# Patient Record
Sex: FEMALE | Race: BLACK OR AFRICAN AMERICAN | Employment: UNEMPLOYED | URBAN - METROPOLITAN AREA
[De-identification: names, ages, dates, MRNs, and addresses within clinical notes are randomized per-mention and may not be internally consistent; named-entity substitution may affect disease eponyms.]

---
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
## 2020-01-17 ENCOUNTER — HOSPITAL ENCOUNTER (EMERGENCY)
Age: 20
Discharge: HOME | End: 2020-01-17
Payer: COMMERCIAL

## 2020-01-17 VITALS
OXYGEN SATURATION: 94 % | DIASTOLIC BLOOD PRESSURE: 56 MMHG | TEMPERATURE: 97.9 F | HEART RATE: 88 BPM | SYSTOLIC BLOOD PRESSURE: 127 MMHG | RESPIRATION RATE: 18 BRPM

## 2020-01-17 PROCEDURE — 90471 IMMUNIZATION ADMIN: CPT | Performed by: NURSE PRACTITIONER

## 2020-01-17 PROCEDURE — 6360000002 HC RX W HCPCS: Performed by: NURSE PRACTITIONER

## 2020-01-17 PROCEDURE — 90715 TDAP VACCINE 7 YRS/> IM: CPT | Performed by: NURSE PRACTITIONER

## 2020-01-17 PROCEDURE — 99282 EMERGENCY DEPT VISIT SF MDM: CPT

## 2020-01-17 RX ADMIN — TETANUS TOXOID, REDUCED DIPHTHERIA TOXOID AND ACELLULAR PERTUSSIS VACCINE, ADSORBED 0.5 ML: 5; 2.5; 8; 8; 2.5 SUSPENSION INTRAMUSCULAR at 14:27

## 2020-01-17 ASSESSMENT — PAIN DESCRIPTION - ORIENTATION: ORIENTATION: RIGHT

## 2020-01-17 ASSESSMENT — PAIN SCALES - GENERAL: PAINLEVEL_OUTOF10: 7

## 2020-01-17 ASSESSMENT — PAIN DESCRIPTION - LOCATION: LOCATION: HAND

## 2020-01-17 ASSESSMENT — PAIN DESCRIPTION - PAIN TYPE: TYPE: ACUTE PAIN

## 2020-01-19 ENCOUNTER — HOSPITAL ENCOUNTER (EMERGENCY)
Age: 20
Discharge: HOME | End: 2020-01-19
Payer: COMMERCIAL

## 2020-01-19 VITALS
DIASTOLIC BLOOD PRESSURE: 71 MMHG | OXYGEN SATURATION: 100 % | WEIGHT: 135 LBS | HEART RATE: 100 BPM | TEMPERATURE: 97.9 F | HEIGHT: 59 IN | SYSTOLIC BLOOD PRESSURE: 124 MMHG | RESPIRATION RATE: 16 BRPM | BODY MASS INDEX: 27.21 KG/M2

## 2020-01-19 PROCEDURE — 99282 EMERGENCY DEPT VISIT SF MDM: CPT

## 2020-01-19 RX ORDER — PREDNISONE 20 MG/1
TABLET ORAL
Qty: 18 TABLET | Refills: 0 | Status: SHIPPED | OUTPATIENT
Start: 2020-01-19 | End: 2020-01-29

## 2020-01-19 RX ORDER — VALACYCLOVIR HYDROCHLORIDE 1 G/1
1000 TABLET, FILM COATED ORAL 3 TIMES DAILY
COMMUNITY
End: 2020-01-19 | Stop reason: SDUPTHER

## 2020-01-19 RX ORDER — VALACYCLOVIR HYDROCHLORIDE 1 G/1
1000 TABLET, FILM COATED ORAL 3 TIMES DAILY
Qty: 21 TABLET | Refills: 0 | Status: SHIPPED | OUTPATIENT
Start: 2020-01-19 | End: 2020-01-26

## 2020-01-19 RX ORDER — GABAPENTIN 100 MG/1
100 CAPSULE ORAL 3 TIMES DAILY
Qty: 21 CAPSULE | Refills: 0 | Status: SHIPPED | OUTPATIENT
Start: 2020-01-19 | End: 2020-01-26

## 2020-01-19 ASSESSMENT — PAIN DESCRIPTION - PAIN TYPE: TYPE: ACUTE PAIN

## 2020-01-19 ASSESSMENT — PAIN DESCRIPTION - DESCRIPTORS: DESCRIPTORS: ACHING

## 2020-01-19 ASSESSMENT — PAIN DESCRIPTION - LOCATION: LOCATION: EYE

## 2020-01-19 ASSESSMENT — PAIN DESCRIPTION - FREQUENCY: FREQUENCY: CONTINUOUS

## 2020-01-19 ASSESSMENT — PAIN SCALES - GENERAL: PAINLEVEL_OUTOF10: 7

## 2020-01-19 ASSESSMENT — PAIN DESCRIPTION - ORIENTATION: ORIENTATION: LEFT

## 2020-01-19 ASSESSMENT — PAIN DESCRIPTION - PROGRESSION: CLINICAL_PROGRESSION: GRADUALLY WORSENING

## 2020-01-19 NOTE — ED PROVIDER NOTES
Specific conditions for emergent return have been discussed and the patient verbalized understanding to return immediately for new or worsening symptoms. Counseling: The emergency provider has spoken with the patient and discussed todays results, in addition to providing specific details for the plan of care and counseling regarding the diagnosis and prognosis. Questions are answered at this time and they are agreeable with the plan. Assessment      1. Herpes simplex      Plan   Discharge to home  Patient condition is good    New Medications     New Prescriptions    GABAPENTIN (NEURONTIN) 100 MG CAPSULE    Take 1 capsule by mouth 3 times daily for 7 days. Intended supply: 90 days    PREDNISONE (DELTASONE) 20 MG TABLET    Sig.: Take 60mg  Po qd x 3 days, then 40mg po qd x3 days, then 20mg po qd x 3 days. QS x 9 days    VALACYCLOVIR (VALTREX) 1 G TABLET    Take 1 tablet by mouth 3 times daily for 7 days     Electronically signed by Dionicio Ward PA-C   DD: 1/19/20  **This report was transcribed using voice recognition software. Every effort was made to ensure accuracy; however, inadvertent computerized transcription errors may be present.   END OF ED PROVIDER NOTE        Dionicio Ward PA-C  01/19/20 0948

## 2020-01-20 NOTE — ED PROVIDER NOTES
of 1/17/2020  2:20 PM        Electronically signed by BETHANY Pompa CNP   DD: 1/20/20  **This report was transcribed using voice recognition software. Every effort was made to ensure accuracy; however, inadvertent computerized transcription errors may be present.   END OF ED PROVIDER NOTE      BETHANY Robertson CNP  01/20/20 7152

## 2020-10-06 ENCOUNTER — HOSPITAL ENCOUNTER (EMERGENCY)
Age: 20
Discharge: HOME | End: 2020-10-06
Payer: COMMERCIAL

## 2020-10-06 VITALS
DIASTOLIC BLOOD PRESSURE: 68 MMHG | TEMPERATURE: 98 F | HEART RATE: 82 BPM | OXYGEN SATURATION: 97 % | RESPIRATION RATE: 16 BRPM | SYSTOLIC BLOOD PRESSURE: 111 MMHG

## 2020-10-06 PROCEDURE — 99282 EMERGENCY DEPT VISIT SF MDM: CPT

## 2020-10-06 PROCEDURE — 99283 EMERGENCY DEPT VISIT LOW MDM: CPT

## 2020-10-06 PROCEDURE — 6370000000 HC RX 637 (ALT 250 FOR IP): Performed by: PHYSICIAN ASSISTANT

## 2020-10-06 RX ORDER — VALACYCLOVIR HYDROCHLORIDE 1 G/1
1000 TABLET, FILM COATED ORAL 3 TIMES DAILY
Qty: 21 TABLET | Refills: 1 | Status: SHIPPED | OUTPATIENT
Start: 2020-10-06 | End: 2020-10-13

## 2020-10-06 RX ORDER — VALACYCLOVIR HYDROCHLORIDE 500 MG/1
1000 TABLET, FILM COATED ORAL ONCE
Status: COMPLETED | OUTPATIENT
Start: 2020-10-06 | End: 2020-10-06

## 2020-10-06 RX ORDER — LIDOCAINE 50 MG/G
OINTMENT TOPICAL
Qty: 1 TUBE | Refills: 0 | Status: SHIPPED | OUTPATIENT
Start: 2020-10-06 | End: 2020-10-15

## 2020-10-06 RX ADMIN — VALACYCLOVIR HYDROCHLORIDE 1000 MG: 500 TABLET, FILM COATED ORAL at 14:25

## 2020-10-06 NOTE — ED PROVIDER NOTES
[facility]     Department of Emergency Medicine   ED  Provider Note  Admit Date/RoomTime: 10/6/2020  1:55 PM  ED Room: /[facility]  Chief Complaint   Rash (R ear, sts hx herpes)    History of Present Illness   Source of history provided by:  patient. History/Exam Limitations: none. Christopher Jones is a 23 y.o. old female with has a past medical history of:   Past Medical History:   Diagnosis Date    Asthma     Herpes     presents to the emergency department by private vehicle, for complaint of sudden onset red, raised, blistering and spreading area on  Right top of pinna which began 1 day(s) prior to arrival.  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 Pt states she has had this many times before and has been admitted to the hospital for it. Patient states she is had skin biopsies and is seen a dermatologist.  Patient states she normally takes Valtrex. Since onset the symptoms have been persistent. Prior history of similar episodes: Yes. Her symptoms are associated with pain and blistering of area and relieved by nothing. She denies any hives, welts, difficulty breathing, difficulty swallowing, wheezing, throat tightness, hoarseness, stridor, itching, lightheadedness, dizziness, facial swelling, lip swelling or tongue swelling. ROS    Pertinent positives and negatives are stated within HPI, all other systems reviewed and are negative. No past surgical history on file. Social History:  reports that she has never smoked. She has never used smokeless tobacco. She reports that she does not drink alcohol or use drugs. Family History: family history includes Asthma in her brother, brother, father, sister, and sister; Cancer in her maternal grandmother; Drug Abuse in her maternal grandfather and maternal grandmother. Allergies: Patient has no known allergies.     Physical Exam           ED Triage Vitals   BP Temp Temp src Pulse Resp SpO2 Height Weight   10/06/20 1401 10/06/20 1357 -- 10/06/20 she needs to follow-up with her family doctor to continue to monitor this. Patient was educated about herpes and if it worsens or gets around the eye area it becomes a medical emergency. Patient is educated to take her antiviral in full. Patient was told it is 1 pill by mouth 3 times a day for the next 7 days. Patient will also be given lidocaine to place on the area. Patient is educated she can alternate Tylenol and ibuprofen for her discomfort. Patient was told to follow-up with a dermatologist as well as her family doctor to continue to monitor the area. Patient was educated when her immune system is low, she is sick or has any trauma to specific areas she is at risk of having an outbreak. Patient was educated discussed with her doctor being on prophylactic therapy. She voiced understanding agreed. Patient denies any headaches, blurry vision, visual disturbances, hearing deficits, chest pain, shortness of breath, fever, chills, nausea, vomiting, cerebral pain, change in bowel or bladder movements or any numbness or weakness bilaterally in her extremities. Patient stable for discharge at this time. Patient was explicitly instructed on specific signs and symptoms on which to return to the emergency room for. Patient was instructed to return to the ER for any new or worsening symptoms. Additional discharge instructions were given verbally. All questions were answered. Patient is comfortable and agreeable with discharge plan. Patient in no acute distress and non-toxic in appearance. Counseling: The emergency provider has spoken with the patient and discussed todays results, in addition to providing specific details for the plan of care and counseling regarding the diagnosis and prognosis. Questions are answered at this time and they are agreeable with the plan. Assessment      1.  Herpes simplex      Plan   Discharge to home  Patient condition is stable    New Medications     New Prescriptions    LIDOCAINE (XYLOCAINE) 5 % OINTMENT    Apply topically as needed. VALACYCLOVIR (VALTREX) 1 G TABLET    Take 1 tablet by mouth 3 times daily for 7 days     Electronically signed by Drea Baugh PA-C   DD: 10/6/20  **This report was transcribed using voice recognition software. Every effort was made to ensure accuracy; however, inadvertent computerized transcription errors may be present.   END OF ED PROVIDER NOTE        Drea Baugh PA-C  10/06/20 2234

## 2020-10-07 ENCOUNTER — HOSPITAL ENCOUNTER (EMERGENCY)
Age: 20
Discharge: HOME | End: 2020-10-07
Payer: COMMERCIAL

## 2020-10-07 VITALS
WEIGHT: 135 LBS | DIASTOLIC BLOOD PRESSURE: 73 MMHG | BODY MASS INDEX: 27.21 KG/M2 | RESPIRATION RATE: 16 BRPM | TEMPERATURE: 97.7 F | HEIGHT: 59 IN | HEART RATE: 100 BPM | SYSTOLIC BLOOD PRESSURE: 114 MMHG | OXYGEN SATURATION: 100 %

## 2020-10-07 PROCEDURE — 6360000002 HC RX W HCPCS: Performed by: NURSE PRACTITIONER

## 2020-10-07 PROCEDURE — 6370000000 HC RX 637 (ALT 250 FOR IP): Performed by: NURSE PRACTITIONER

## 2020-10-07 PROCEDURE — 99283 EMERGENCY DEPT VISIT LOW MDM: CPT

## 2020-10-07 PROCEDURE — 96372 THER/PROPH/DIAG INJ SC/IM: CPT

## 2020-10-07 RX ORDER — DEXAMETHASONE SODIUM PHOSPHATE 10 MG/ML
10 INJECTION, SOLUTION INTRAMUSCULAR; INTRAVENOUS ONCE
Status: COMPLETED | OUTPATIENT
Start: 2020-10-07 | End: 2020-10-07

## 2020-10-07 RX ORDER — LIDOCAINE HYDROCHLORIDE 20 MG/ML
JELLY TOPICAL PRN
Status: DISCONTINUED | OUTPATIENT
Start: 2020-10-07 | End: 2020-10-07 | Stop reason: HOSPADM

## 2020-10-07 RX ADMIN — LIDOCAINE HYDROCHLORIDE: 20 JELLY TOPICAL at 02:07

## 2020-10-07 RX ADMIN — DEXAMETHASONE SODIUM PHOSPHATE 10 MG: 10 INJECTION, SOLUTION INTRAMUSCULAR; INTRAVENOUS at 02:06

## 2020-10-07 ASSESSMENT — PAIN DESCRIPTION - PAIN TYPE: TYPE: ACUTE PAIN

## 2020-10-07 ASSESSMENT — PAIN DESCRIPTION - ORIENTATION: ORIENTATION: RIGHT

## 2020-10-07 ASSESSMENT — PAIN DESCRIPTION - DESCRIPTORS: DESCRIPTORS: THROBBING

## 2020-10-07 ASSESSMENT — PAIN SCALES - GENERAL: PAINLEVEL_OUTOF10: 8

## 2020-10-07 ASSESSMENT — PAIN DESCRIPTION - LOCATION: LOCATION: EAR

## 2020-10-07 NOTE — ED PROVIDER NOTES
[facility]  HPI:  10/7/20, Time: 1:47 AM EDT         Kel Romo is a 23 y.o. female presenting to the ED for continued discomfort related to her herpes zoster. Patient was seen and evaluated earlier in the day and was started on Valtrex, she was provided with first dose in the emergency room and she took an additional 2 more doses as prescribed. Patient with long history of herpes simplex 1. Patient denies any airway compromise related to this. She does admit to being inpatient and just cannot bear with the pain. She does have 2 small vesicles to the right upper earlobe, she denies any other lesions denies any other new additional concerns. She states that her care service would not allow her to have the lidocaine this also made her upset. Patient otherwise nontoxic and denies fevers    Review of Systems:   Pertinent positives and negatives are stated within HPI, all other systems reviewed and are negative.          --------------------------------------------- PAST HISTORY ---------------------------------------------  Past Medical History:  has a past medical history of Asthma and Herpes. Past Surgical History:  has no past surgical history on file. Social History:  reports that she has never smoked. She has never used smokeless tobacco. She reports that she does not drink alcohol or use drugs. Family History: family history includes Asthma in her brother, brother, father, sister, and sister; Cancer in her maternal grandmother; Drug Abuse in her maternal grandfather and maternal grandmother. The patients home medications have been reviewed. Allergies: Patient has no known allergies. -------------------------------------------------- RESULTS -------------------------------------------------  All laboratory and radiology results have been personally reviewed by myself   LABS:  No results found for this visit on 10/07/20.     RADIOLOGY:  Interpreted by Radiologist.  No orders to display ------------------------- NURSING NOTES AND VITALS REVIEWED ---------------------------   The nursing notes within the ED encounter and vital signs as below have been reviewed. /73   Pulse 100   Temp 97.7 °F (36.5 °C)   Resp 16   Ht 4' 11\" (1.499 m)   Wt 135 lb (61.2 kg)   LMP 09/15/2020   SpO2 100%   BMI 27.27 kg/m²   Oxygen Saturation Interpretation: Normal      ---------------------------------------------------PHYSICAL EXAM--------------------------------------      Constitutional/General: Alert and oriented x3, well appearing, non toxic in NAD  Head: Normocephalic and atraumatic  Eyes: PERRL, EOMI  Mouth: Oropharynx clear, handling secretions, no trismus  Neck: Supple, full ROM,   Pulmonary: Lungs clear to auscultation bilaterally, no wheezes, rales, or rhonchi. Not in respiratory distress  Cardiovascular:  Regular rate and rhythm, no murmurs, gallops, or rubs. 2+ distal pulses  Abdomen: Soft, non tender, non distended,   Extremities: Moves all extremities x 4. Warm and well perfused  Skin: warm and dry without rash, She does have 2 small vesicles to the right upper earlobe no surrounding erythema drainage  Neurologic: GCS 15,  Psych: Normal Affect      ------------------------------ ED COURSE/MEDICAL DECISION MAKING----------------------  Medications   dexamethasone (PF) (DECADRON) injection 10 mg (10 mg Intramuscular Given 10/7/20 0206)         ED COURSE:       Medical Decision Making:    Patient was already seen earlier in the day. She does have an active prescription that was provided to her during that emergency room visit for Valtrex. She has taken the full 3 doses today. Plan will be to provide her with Decadron 10 mg IM she is to continue take Motrin or Tylenol for additional pain relief as well as avoid touching the area and follow-up with her primary care physician. She was also provided with lidocaine topical solution here to assist with further reduction in pain.   Patient without any ocular or oral involvement, airway intact, no wheezing no stridor, patient expressed understanding of good follow-up care as well as when to return back to the emergency room with full understanding. Patient safely discharged home    Counseling: The emergency provider has spoken with the patient and discussed todays results, in addition to providing specific details for the plan of care and counseling regarding the diagnosis and prognosis. Questions are answered at this time and they are agreeable with the plan.      --------------------------------- IMPRESSION AND DISPOSITION ---------------------------------    IMPRESSION  1. Herpes zoster without complication        DISPOSITION  Disposition: Discharge to home  Patient condition is good      NOTE: This report was transcribed using voice recognition software.  Every effort was made to ensure accuracy; however, inadvertent computerized transcription errors may be present     BETHANY Fisher CNP  10/07/20 3866  ATTENDING PROVIDER ATTESTATION:     Supervising Physician, on-site, available for consultation, non-participatory in the evaluation or care of this patient       Ria Walls MD  10/09/20 6242

## 2020-10-15 ENCOUNTER — OFFICE VISIT (OUTPATIENT)
Dept: FAMILY MEDICINE CLINIC | Age: 20
End: 2020-10-15
Payer: COMMERCIAL

## 2020-10-15 VITALS
HEIGHT: 59 IN | WEIGHT: 139 LBS | SYSTOLIC BLOOD PRESSURE: 106 MMHG | BODY MASS INDEX: 28.02 KG/M2 | HEART RATE: 83 BPM | RESPIRATION RATE: 12 BRPM | DIASTOLIC BLOOD PRESSURE: 70 MMHG | TEMPERATURE: 98.1 F | OXYGEN SATURATION: 94 %

## 2020-10-15 PROCEDURE — G8427 DOCREV CUR MEDS BY ELIG CLIN: HCPCS | Performed by: FAMILY MEDICINE

## 2020-10-15 PROCEDURE — G8484 FLU IMMUNIZE NO ADMIN: HCPCS | Performed by: FAMILY MEDICINE

## 2020-10-15 PROCEDURE — 99212 OFFICE O/P EST SF 10 MIN: CPT | Performed by: FAMILY MEDICINE

## 2020-10-15 PROCEDURE — 1036F TOBACCO NON-USER: CPT | Performed by: FAMILY MEDICINE

## 2020-10-15 PROCEDURE — G8419 CALC BMI OUT NRM PARAM NOF/U: HCPCS | Performed by: FAMILY MEDICINE

## 2020-10-15 PROCEDURE — 99213 OFFICE O/P EST LOW 20 MIN: CPT | Performed by: FAMILY MEDICINE

## 2020-10-15 RX ORDER — FLUCONAZOLE 150 MG/1
150 TABLET ORAL ONCE
Qty: 1 TABLET | Refills: 0 | Status: SHIPPED | OUTPATIENT
Start: 2020-10-15 | End: 2020-10-15

## 2020-10-15 RX ORDER — VALACYCLOVIR HYDROCHLORIDE 500 MG/1
500 TABLET, FILM COATED ORAL 3 TIMES DAILY
COMMUNITY
End: 2021-04-25 | Stop reason: SDUPTHER

## 2020-10-15 ASSESSMENT — PATIENT HEALTH QUESTIONNAIRE - PHQ9
SUM OF ALL RESPONSES TO PHQ QUESTIONS 1-9: 0
SUM OF ALL RESPONSES TO PHQ QUESTIONS 1-9: 0
1. LITTLE INTEREST OR PLEASURE IN DOING THINGS: 0
2. FEELING DOWN, DEPRESSED OR HOPELESS: 0
SUM OF ALL RESPONSES TO PHQ QUESTIONS 1-9: 0
SUM OF ALL RESPONSES TO PHQ9 QUESTIONS 1 & 2: 0

## 2020-10-15 NOTE — PROGRESS NOTES
Family Medicine Residency Program  Office Progress Note      Patient : Hamida Hastings Sex : female   Age : 23 y.o.  : 2000   MRN :  <[id number]>       Date of Service : 10/16/2020    Chief Complaint :   Chief Complaint   Patient presents with   [address] Vaginal Discharge     white for past 5 days       History of Present Illness :    white vaginal discharge: thick, like cottage cheese over the last 4 to 5 days. Patient denies any vaginal itching, irritation, burning, bleeding. Patient denies any urinary symptoms. Is sexually active with 1 male partner her fiancé, does not use condoms. Never treated for STIs besides the herpetic outbreaks. Has not been treated for this, no history of yeast infection. Asthma: controlled on albuterol as needed, uses very infrequently. No nighttime symptoms cough minimal.    Review of Systems     Review of Systems   Constitutional: Negative for chills and fever. Respiratory: Negative for cough, shortness of breath and wheezing. Cardiovascular: Negative for chest pain and palpitations. Gastrointestinal: Negative for abdominal pain, diarrhea, nausea and vomiting. Genitourinary: Positive for vaginal discharge. Negative for difficulty urinating, frequency, hematuria, vaginal bleeding and vaginal pain. The rest of ROS as per HPI        Past Medical History - Reviewed     has a past medical history of Asthma and Herpes. Social History - Reviewed     reports that she has never smoked. She has never used smokeless tobacco. She reports that she does not drink alcohol or use drugs. Current Medications & Allergies - Reviewed    Current Outpatient Medications   Medication Sig Dispense Refill    valACYclovir (VALTREX) 500 MG tablet Take 500 mg by mouth 3 times daily       No current facility-administered medications for this visit. has No Known Allergies.           PhysicalExam     VITAL SIGNS :   Vitals: treat as such. F/u in 2 weeks, will do pelvic and swabs if no improvement in sx. Mild persistent asthma, unspecified whether complicated  Stable, CPM          Reviewed age and gender appropriate health screening exams and vaccinations. Advised patient regarding importance of keeping up withrecommended health maintenance and to schedule as soon as possible if overdue, as this is important in assessing for undiagnosed pathology, especially cancer, as well as protecting against potentially harmful/lifethreatening disease. Refilled all appropriate medications today. RTO in 2 weeks for discharge f/u    Future Appointments   Date Time Provider Elian Ceja   10/29/2020  1:40 PM [facility]           Signed by : Donald Turner M.D., PGY-2    This case was discussedwith Dr Radha (s)        This note was dictated with [address]. This note, in part or full,may have been transcribed using voice recognition software. Every effort was made to ensure accuracy; however, inadvertent computerized transcription errors may be present. Please excuse any transcriptional grammatical or spelling errors that may have escaped my editorial review.

## 2020-10-15 NOTE — PATIENT INSTRUCTIONS
Patient Education        Vaginal Yeast Infection: Care Instructions  Your Care Instructions     A vaginal yeast infection is caused by too many yeast cells in the vagina. This is common in women of all ages. Itching, vaginal discharge and irritation, and other symptoms can bother you. But yeast infections don't often cause other health problems. Some medicines can increase your risk of getting a yeast infection. These include antibiotics, birth control pills, hormones, and steroids. You may also be more likely to get a yeast infection if you are pregnant, have diabetes, douche, or wear tight clothes. With treatment, most yeast infections get better in 2 to 3 days. Follow-up care is a key part of your treatment and safety. Be sure to make and go to all appointments, and call your doctor if you are having problems. It's also a good idea to know your test results and keep a list of the medicines you take. How can you care for yourself at home? · Take your medicines exactly as prescribed. Call your doctor if you think you are having a problem with your medicine. · Ask your doctor about over-the-counter (OTC) medicines for yeast infections. They may cost less than prescription medicines. If you use an OTC treatment, read and follow all instructions on the label. · Do not use tampons while using a vaginal cream or suppository. The tampons can absorb the medicine. Use pads instead. · Wear loose cotton clothing. Do not wear nylon or other fabric that holds body heat and moisture close to the skin. · Try sleeping without underwear. · Do not scratch. Relieve itching with a cold pack or a cool bath. · Do not wash your vaginal area more than once a day. Use plain water or a mild, unscented soap. Air-dry the vaginal area. · Change out of wet swimsuits after swimming. · Do not have sex until you have finished your treatment. · Do not douche. When should you call for help?    Call your doctor now or seek immediate medical care if:    · You have unexpected vaginal bleeding.     · You have new or increased pain in your vagina or pelvis. Watch closely for changes in your health, and be sure to contact your doctor if:    · You have a fever.     · You are not getting better after 2 days.     · Your symptoms come back after you finish your medicines. Where can you learn more? Go to https://[email or web address] org and sign in to your [employer] account. Enter O969 in the AutoUncle box to learn more about \"Vaginal Yeast Infection: Care Instructions. \"     If you do not have an account, please click on the \"Sign Up Now\" link. Current as of: November 8, 2019               Content Version: 12.6  © 4197-4152 [employer]. Care instructions adapted under license by [facility] ([facility]). If you have questions about a medical condition or this instruction, always ask your healthcare professional. [address] any warranty or liability for your use of this information.

## 2020-10-15 NOTE — PROGRESS NOTES
63-year-old female with history of asthma and herpes here for physical exam.  Patient is also reporting white vaginal discharge, thick, like cottage cheese over the last 4 to 5 days. Patient denies any vaginal itching, irritation, burning, bleeding. Patient denies any urinary symptoms. Is sexually active with 1 male partner her fiancé, does not use condoms. Never treated for STIs besides the herpetic outbreaks. Has not been treated for this, no history of yeast infection. Asthma controlled on albuterol as needed, uses very infrequently. No nighttime symptoms cough minimal.    Blood pressure 106/70, pulse 83, temperature 98.1 °F (36.7 °C), temperature source Temporal, resp. rate 12, height 4' 11\" (1.499 m), weight 139 lb (63 kg), last menstrual period 09/22/2020, SpO2 94 %, not currently breastfeeding. HEENT WNL     Heart regular    Lungs slight wheeze right lower lung field that resolved with reauscultation    abd non-tender      No edema    Pulses intact     Assessment and plan  Vaginal discharge: Based upon symptomatology described, likely yeast infection and will treat as such. Will treat with Diflucan one-time, evaluation and pelvic exam if symptoms do not improve  Asthma: Stable continue with albuterol as needed, consider daily inhaler if symptoms begin to worsen    Return to clinic 2 weeks for discharge follow-up    Attending Physician Statement  I have discussed the case, including pertinent history and exam findings with the resident. I agree with the documented assessment and plan.

## 2020-10-16 ASSESSMENT — ENCOUNTER SYMPTOMS
VOMITING: 0
DIARRHEA: 0
COUGH: 0
NAUSEA: 0
SHORTNESS OF BREATH: 0
WHEEZING: 0
ABDOMINAL PAIN: 0

## 2020-10-29 ENCOUNTER — OFFICE VISIT (OUTPATIENT)
Dept: FAMILY MEDICINE CLINIC | Age: 20
End: 2020-10-29
Payer: COMMERCIAL

## 2020-10-29 VITALS
WEIGHT: 139 LBS | DIASTOLIC BLOOD PRESSURE: 52 MMHG | TEMPERATURE: 98.1 F | HEART RATE: 77 BPM | HEIGHT: 59 IN | OXYGEN SATURATION: 99 % | BODY MASS INDEX: 28.02 KG/M2 | SYSTOLIC BLOOD PRESSURE: 104 MMHG

## 2020-10-29 PROCEDURE — 99212 OFFICE O/P EST SF 10 MIN: CPT | Performed by: FAMILY MEDICINE

## 2020-10-29 PROCEDURE — G8427 DOCREV CUR MEDS BY ELIG CLIN: HCPCS | Performed by: FAMILY MEDICINE

## 2020-10-29 PROCEDURE — 1036F TOBACCO NON-USER: CPT | Performed by: FAMILY MEDICINE

## 2020-10-29 PROCEDURE — 99213 OFFICE O/P EST LOW 20 MIN: CPT | Performed by: FAMILY MEDICINE

## 2020-10-29 PROCEDURE — G8484 FLU IMMUNIZE NO ADMIN: HCPCS | Performed by: FAMILY MEDICINE

## 2020-10-29 PROCEDURE — G8419 CALC BMI OUT NRM PARAM NOF/U: HCPCS | Performed by: FAMILY MEDICINE

## 2020-10-29 RX ORDER — METRONIDAZOLE 500 MG/1
500 TABLET ORAL 2 TIMES DAILY
Qty: 14 TABLET | Refills: 0 | Status: SHIPPED | OUTPATIENT
Start: 2020-10-29 | End: 2020-11-05

## 2020-10-29 NOTE — PROGRESS NOTES
[address] [place] [address]  FAMILY MEDICINE RESIDENCY PROGRAM  DATE OF VISIT : 10/29/2020    Patient : Yamile Le   Age : 23 y.o.  : 2000   MRN : <[id number]>   ______________________________________________________________________    Chief Complaint :   Chief Complaint   Patient presents with    2 Week Follow-Up       HPI : Yamile Le is 23 y.o. female who presented to the clinic today for f/u vag d/c. Treated with diflucan for presumed yeast infection, irritation and discharge did improve but still having discharge. Now more thin and white, no abnormal bleeding or pain. Sexually monogamous, 1 male partner, no barrier protection. No chills, dysuria, abdominal pain, N/V/D. Past Medical History :  Past Medical History:   Diagnosis Date    Asthma     Herpes      No past surgical history on file. Review of Systems :    ROS - Per HPI   ______________________________________________________________________    Physical Exam :    Vitals: BP (!) 104/52 (Site: Right Upper Arm, Position: Sitting, Cuff Size: Medium Adult)   Pulse 77   Temp 98.1 °F (36.7 °C) (Temporal)   Ht 4' 11\" (1.499 m)   Wt 139 lb (63 kg)   LMP 10/16/2020   SpO2 99%   BMI 28.07 kg/m²   GENERAL: Alert, cooperative, no acute distress. CHEST: No tenderness or deformity, full & symmetric excursion  LUNG: Clear to auscultation bilaterally,  respirations unlabored. No rales/wheezing/rubs  HEART: RRR, S1 and S2 normal, no murmur, rub or gallop. DP pulses 2/4  ABDOMEN: SNTND, no masses, no organomegaly, no guarding, rebound or rigidity. EXTREMITIES:  Extremities normal, atraumatic, no cyanosis or edema. Distal pulses equal bilaterally  Pelvic: no external lesions. White, thin discharge noted on speculum exam. Reviewed under microscope, clue cells noted  ______________________________________________________________________    Assessment & Plan :     Diagnosis Orders   1.  BV (bacterial vaginosis)  metroNIDAZOLE (FLAGYL) 500 MG tablet   2. Vaginal discharge       Improved with diflucan however still with thinner white d/c. Clue cells on microscopy. Treat for BV. Return prn if symptoms persist or worsen.      RTC if sx worsen    Anmol Lorenzo MD PGY-2    Discussed with: Dr. Tali Mcnulty

## 2020-10-29 NOTE — PROGRESS NOTES
S: Yisel Hamm 23 y.o. female  here for persistent vaginal discharge  See resident note for history and physical  O: VS: BP (!) 104/52 (Site: Right Upper Arm, Position: Sitting, Cuff Size: Medium Adult)   Pulse 77   Temp 98.1 °F (36.7 °C) (Temporal)   Ht 4' 11\" (1.499 m)   Wt 139 lb (63 kg)   LMP 10/16/2020   SpO2 99%   BMI 28.07 kg/m²    General: NAD   CV:  RRR, no gallops, rubs, or murmurs   Resp: CTAB no R/R/W   Abd:  Soft, nontender, no masses    Ext:  no C/C/E      See resident note for  exam and microscopic inspection of specimen   Assessment / Plan:      Dora March was seen today for 2 week follow-up. Diagnoses and all orders for this visit:    BV (bacterial vaginosis)  -     metroNIDAZOLE (FLAGYL) 500 MG tablet; Take 1 tablet by mouth 2 times daily for 7 days    Vaginal discharge        Return if symptoms worsen or fail to improve. Attending Physician Statement  I have discussed the case, including pertinent history and exam findings with the resident. I agree with the documented assessment and plan.          Maranda Li MD

## 2021-01-02 ENCOUNTER — HOSPITAL ENCOUNTER (EMERGENCY)
Age: 21
Discharge: HOME | End: 2021-01-02
Payer: COMMERCIAL

## 2021-01-02 VITALS
DIASTOLIC BLOOD PRESSURE: 72 MMHG | HEART RATE: 81 BPM | RESPIRATION RATE: 14 BRPM | SYSTOLIC BLOOD PRESSURE: 124 MMHG | WEIGHT: 150 LBS | BODY MASS INDEX: 27.6 KG/M2 | TEMPERATURE: 97.3 F | OXYGEN SATURATION: 98 % | HEIGHT: 62 IN

## 2021-01-02 DIAGNOSIS — B02.9 HERPES ZOSTER WITHOUT COMPLICATION: Primary | ICD-10-CM

## 2021-01-02 PROCEDURE — 99283 EMERGENCY DEPT VISIT LOW MDM: CPT

## 2021-01-02 RX ORDER — VALACYCLOVIR HYDROCHLORIDE 1 G/1
1000 TABLET, FILM COATED ORAL 3 TIMES DAILY
Qty: 21 TABLET | Refills: 0 | Status: SHIPPED | OUTPATIENT
Start: 2021-01-02 | End: 2021-01-09

## 2021-01-02 RX ORDER — NAPROXEN 500 MG/1
500 TABLET ORAL 2 TIMES DAILY PRN
Qty: 28 TABLET | Refills: 0 | Status: SHIPPED | OUTPATIENT
Start: 2021-01-02 | End: 2021-04-23 | Stop reason: ALTCHOICE

## 2021-01-02 ASSESSMENT — ENCOUNTER SYMPTOMS
COUGH: 0
COLOR CHANGE: 0
EYE DISCHARGE: 0
PHOTOPHOBIA: 0
SHORTNESS OF BREATH: 0
EYE ITCHING: 0
EYE REDNESS: 0
CHEST TIGHTNESS: 0
EYE PAIN: 0

## 2021-01-02 ASSESSMENT — PAIN SCALES - GENERAL: PAINLEVEL_OUTOF10: 7

## 2021-01-02 NOTE — ED PROVIDER NOTES
[facility]        Department of Emergency Medicine   ED  Provider Note  Admit Date/RoomTime: 2021  2:09 PM  ED Room: [facility]  HPI:  21, Time: 2:25 PM EST      The patient is a 70-year-old female with a longstanding history of recurrent herpes zoster presenting to the emergency department with concern for herpes outbreak adjacent to her left eye. She states it started 3 days ago. She states she did take 2 doses of valacyclovir at home but realized that the prescription was . She has not been taking anything for pain and does state it is extremely painful. She states she has had it in this location multiple times and has been getting recurrent herpes zoster since she was 11years old. She has seen dermatology and had multiple skin biopsies. She last had about 2 months ago on her ear. She states that there is no involvement of her eyes and that is just the skin next to her eye that is painful. She denies any vision changes, foreign body sensation, photophobia, fever/chills, crusting or drainage from the lesion. The history is provided by the patient. No  was used. REVIEW OF SYSTEMS:  Review of Systems   Constitutional: Negative for activity change, chills, fatigue and fever. Eyes: Negative for photophobia, pain, discharge, redness, itching and visual disturbance. Respiratory: Negative for cough, chest tightness and shortness of breath. Cardiovascular: Negative for chest pain, palpitations and leg swelling. Musculoskeletal: Negative for arthralgias, joint swelling and myalgias. Skin: Positive for rash. Negative for color change, pallor and wound. Neurological: Negative for dizziness, weakness, light-headedness and headaches. Hematological: Does not bruise/bleed easily. Psychiatric/Behavioral: Negative for agitation, behavioral problems and confusion.       Pertinent positives and negatives are stated within HPI, all other systems reviewed and are negative.      --------------------------------------------- PAST HISTORY ---------------------------------------------  Past Medical History:  has a past medical history of Asthma and Herpes. Past Surgical History:  has no past surgical history on file. Social History:  reports that she has never smoked. She has never used smokeless tobacco. She reports that she does not drink alcohol or use drugs. Family History: family history includes Asthma in her brother, brother, father, sister, and sister; Cancer in her maternal grandmother; Drug Abuse in her maternal grandfather and maternal grandmother. The patients home medications have been reviewed. Allergies: Patient has no known allergies. -------------------------------------------------- RESULTS -------------------------------------------------  All laboratory and radiology results have been personally reviewed by myself   LABS:  No results found for this visit on 01/02/21. RADIOLOGY:  Interpreted by Radiologist.  No orders to display       ------------------------- NURSING NOTES AND VITALS REVIEWED ---------------------------   The nursing notes within the ED encounter and vital signs as below have been reviewed. /72   Pulse 81   Temp 97.3 °F (36.3 °C)   Resp 14   Ht 5' 2\" (1.575 m)   Wt 150 lb (68 kg)   LMP 01/02/2021   SpO2 98%   BMI 27.44 kg/m²   Oxygen Saturation Interpretation: Normal      ---------------------------------------------------PHYSICAL EXAM--------------------------------------    Physical Exam  Vitals signs and nursing note reviewed. Constitutional:       General: She is not in acute distress. Appearance: Normal appearance. She is well-developed. She is not ill-appearing. HENT:      Head: Normocephalic and atraumatic. Mouth/Throat:      Mouth: Mucous membranes are moist.      Pharynx: Oropharynx is clear. No oropharyngeal exudate. Eyes:      General:         Right eye: No discharge. Left eye: No discharge. Extraocular Movements: Extraocular movements intact. Conjunctiva/sclera: Conjunctivae normal.      Pupils: Pupils are equal, round, and reactive to light. Comments: Small cluster of erythematous papules just adjacent to the left lateral canthus. No intraocular involvement. Neck:      Vascular: No JVD. Trachea: No tracheal deviation. Cardiovascular:      Rate and Rhythm: Normal rate and regular rhythm. Heart sounds: Normal heart sounds. No murmur. Pulmonary:      Effort: Pulmonary effort is normal. No respiratory distress. Breath sounds: Normal breath sounds. Musculoskeletal: Normal range of motion. General: No deformity. Skin:     General: Skin is warm and dry. Capillary Refill: Capillary refill takes less than 2 seconds. Coloration: Skin is not pale. Findings: No erythema. Neurological:      Mental Status: She is alert and oriented to person, place, and time. Cranial Nerves: No cranial nerve deficit. Psychiatric:         Mood and Affect: Mood normal.         Behavior: Behavior normal.         Thought Content: Thought content normal.            ------------------------------ ED COURSE/MEDICAL DECISION MAKING----------------------  Medications - No data to display      ED COURSE:      No orders to display         Procedures:  Procedures     Medical Decision Making:   MDM   70-year-old female presenting to the emergency department with concerns for herpes zoster outbreak. She has a history of recurrent zoster outbreaks with the last one being about 2 months ago. She states is been going on since she was 11years old. There does appear to be some erythematous raised papules adjacent to the left lateral canthus without any involvement of the eye and she has no intraocular symptoms. Patient will be placed on valacyclovir and naproxen. She is encouraged to follow-up with her PCP.   She is educated on signs and symptoms to

## 2021-02-15 ENCOUNTER — OFFICE VISIT (OUTPATIENT)
Dept: FAMILY MEDICINE CLINIC | Age: 21
End: 2021-02-15
Payer: COMMERCIAL

## 2021-02-15 VITALS
HEIGHT: 59 IN | DIASTOLIC BLOOD PRESSURE: 56 MMHG | SYSTOLIC BLOOD PRESSURE: 114 MMHG | OXYGEN SATURATION: 96 % | WEIGHT: 138 LBS | TEMPERATURE: 97.8 F | BODY MASS INDEX: 27.82 KG/M2 | HEART RATE: 81 BPM

## 2021-02-15 DIAGNOSIS — N76.0 BV (BACTERIAL VAGINOSIS): Primary | ICD-10-CM

## 2021-02-15 DIAGNOSIS — B96.89 BV (BACTERIAL VAGINOSIS): Primary | ICD-10-CM

## 2021-02-15 DIAGNOSIS — Z11.3 SCREENING FOR STD (SEXUALLY TRANSMITTED DISEASE): ICD-10-CM

## 2021-02-15 LAB
BILIRUBIN, POC: NEGATIVE
BLOOD URINE, POC: NEGATIVE
CLARITY, POC: NORMAL
COLOR, POC: NORMAL
GLUCOSE URINE, POC: NEGATIVE
KETONES, POC: NEGATIVE
LEUKOCYTE EST, POC: NORMAL
NITRITE, POC: NEGATIVE
PH, POC: 6
PROTEIN, POC: NEGATIVE
SPECIFIC GRAVITY, POC: 1.02
UROBILINOGEN, POC: 1

## 2021-02-15 PROCEDURE — 1036F TOBACCO NON-USER: CPT | Performed by: FAMILY MEDICINE

## 2021-02-15 PROCEDURE — G8419 CALC BMI OUT NRM PARAM NOF/U: HCPCS | Performed by: FAMILY MEDICINE

## 2021-02-15 PROCEDURE — 99212 OFFICE O/P EST SF 10 MIN: CPT | Performed by: FAMILY MEDICINE

## 2021-02-15 PROCEDURE — 99213 OFFICE O/P EST LOW 20 MIN: CPT | Performed by: FAMILY MEDICINE

## 2021-02-15 PROCEDURE — G8427 DOCREV CUR MEDS BY ELIG CLIN: HCPCS | Performed by: FAMILY MEDICINE

## 2021-02-15 PROCEDURE — G8484 FLU IMMUNIZE NO ADMIN: HCPCS | Performed by: FAMILY MEDICINE

## 2021-02-15 PROCEDURE — 81002 URINALYSIS NONAUTO W/O SCOPE: CPT | Performed by: FAMILY MEDICINE

## 2021-02-15 RX ORDER — GREEN TEA/HOODIA GORDONII 315-12.5MG
1 CAPSULE ORAL 2 TIMES DAILY
Qty: 60 TABLET | Refills: 0 | Status: SHIPPED | OUTPATIENT
Start: 2021-02-15 | End: 2021-03-17

## 2021-02-15 RX ORDER — METRONIDAZOLE 500 MG/1
500 TABLET ORAL 2 TIMES DAILY
Qty: 14 TABLET | Refills: 0 | Status: SHIPPED | OUTPATIENT
Start: 2021-02-15 | End: 2021-02-22

## 2021-02-15 ASSESSMENT — ENCOUNTER SYMPTOMS
NAUSEA: 0
ABDOMINAL PAIN: 0
SHORTNESS OF BREATH: 0
COUGH: 0
WHEEZING: 0
DIARRHEA: 0
VOMITING: 0
CONSTIPATION: 0

## 2021-02-15 ASSESSMENT — PATIENT HEALTH QUESTIONNAIRE - PHQ9
SUM OF ALL RESPONSES TO PHQ9 QUESTIONS 1 & 2: 2
SUM OF ALL RESPONSES TO PHQ QUESTIONS 1-9: 2
SUM OF ALL RESPONSES TO PHQ QUESTIONS 1-9: 2

## 2021-02-19 LAB
C. TRACHOMATIS DNA ,URINE: NEGATIVE
N. GONORRHOEAE DNA, URINE: NEGATIVE
SOURCE: NORMAL

## 2021-04-23 ENCOUNTER — APPOINTMENT (OUTPATIENT)
Dept: GENERAL RADIOLOGY | Age: 21
End: 2021-04-23
Payer: COMMERCIAL

## 2021-04-23 ENCOUNTER — HOSPITAL ENCOUNTER (EMERGENCY)
Age: 21
Discharge: HOME | End: 2021-04-23
Payer: COMMERCIAL

## 2021-04-23 VITALS
RESPIRATION RATE: 16 BRPM | TEMPERATURE: 97.3 F | DIASTOLIC BLOOD PRESSURE: 64 MMHG | HEART RATE: 83 BPM | SYSTOLIC BLOOD PRESSURE: 106 MMHG | OXYGEN SATURATION: 98 %

## 2021-04-23 DIAGNOSIS — S93.401A SPRAIN OF RIGHT ANKLE, UNSPECIFIED LIGAMENT, INITIAL ENCOUNTER: Primary | ICD-10-CM

## 2021-04-23 PROCEDURE — 73610 X-RAY EXAM OF ANKLE: CPT

## 2021-04-23 PROCEDURE — 6370000000 HC RX 637 (ALT 250 FOR IP): Performed by: NURSE PRACTITIONER

## 2021-04-23 PROCEDURE — 99283 EMERGENCY DEPT VISIT LOW MDM: CPT

## 2021-04-23 RX ORDER — OXYCODONE HYDROCHLORIDE AND ACETAMINOPHEN 5; 325 MG/1; MG/1
1 TABLET ORAL ONCE
Status: COMPLETED | OUTPATIENT
Start: 2021-04-23 | End: 2021-04-23

## 2021-04-23 RX ORDER — OXYCODONE HYDROCHLORIDE AND ACETAMINOPHEN 5; 325 MG/1; MG/1
1 TABLET ORAL EVERY 6 HOURS PRN
Qty: 2 TABLET | Refills: 0 | Status: SHIPPED | OUTPATIENT
Start: 2021-04-23 | End: 2021-04-26

## 2021-04-23 RX ORDER — IBUPROFEN 800 MG/1
800 TABLET ORAL EVERY 8 HOURS PRN
Qty: 21 TABLET | Refills: 0 | Status: SHIPPED | OUTPATIENT
Start: 2021-04-23 | End: 2021-04-25

## 2021-04-23 RX ADMIN — OXYCODONE HYDROCHLORIDE AND ACETAMINOPHEN 1 TABLET: 5; 325 TABLET ORAL at 20:16

## 2021-04-23 ASSESSMENT — PAIN SCALES - GENERAL: PAINLEVEL_OUTOF10: 8

## 2021-04-24 NOTE — ED PROVIDER NOTES
[facility]  HPI: Jaylen Duff 21 y.o. female with a past medical history of   Past Medical History:   Diagnosis Date    Asthma     Herpes      presents status post right ankle injury. The patient states that the injury happened acutely. The history is obtained from the patient.  The mechanism of injury is apparent and was lateral of the foot. Patient describes the pain as aching and pressure. Patient states the pain worsens on ambulation and with any weightbearing. Patient denies any distal neurologic symptoms including numbness, tingling, paralysis or coolness of the foot. No other reported injuries or other extremity tenderness or injuries. Patient presents emergency department with right lateral ankle pain. Patient reports that last night, 1 day ago she was walking and rolled her right ankle. States that she has been attempting to walk but has having increased pain. States that she did take one of her mom's Norco tablets without complete relief. Patient does not have any gross deformity noted. She does report pain primarily to the lateral aspect she denied striking her head denied any anticoagulation use and she denies any weakness or dizziness associated with this. Patient denies any other areas of injury. Patient reports pain has been constant and sharp and shooting at times especially with ambulation and persistent.      Review of Systems:   Pertinent positives and negatives are stated within HPI, all other systems reviewed and are negative.             --------------------------------------------- PAST HISTORY ---------------------------------------------  Past Medical History:  has a past medical history of Asthma and Herpes. Past Surgical History:  has no past surgical history on file. Social History:  reports that she has never smoked. She has never used smokeless tobacco. She reports that she does not drink alcohol or use drugs.     Family History: family history includes Asthma in her brother, brother, father, sister, and sister; Cancer in her maternal grandmother; Drug Abuse in her maternal grandfather and maternal grandmother. The patients home medications have been reviewed. Allergies: Patient has no known allergies. Physical exam:  Constitutional: The patient is comfortable, well appearing, non toxic. The patient is alert and oriented and conversant.     Head: The head is atraumatic and normocephalic.     Eyes: No discharge is present from the eyes. The sclera are normal.     ENT: The oropharynx is normal. The mouth is normal to inspection.     Neck: Normal range of motion is achieved in the neck. .     Respiratory/chest: The chest is nontender. Breath sounds are normal. There is no respiratory distress.     Cardiovascular: Heart shows a regular rate and rhythm without murmurs, rubs or gallops.     Lower extremity exam: There is no obvious compartment syndrome. The knee evaluation shows that both knees have no deformity, no swelling, and no proximal fibular head tenderness. There is full painless range of motion of both hips. The ankle evaluation shows normal tendon function, capillary refill less than 2 seconds, distal motor function which is intact, distal sensory function which is intact. The achilies tendon is intact. There is localized swelling and tenderness noted of the ankle along the lateral aspect. The foot evaluation shows no tenderness at the base of the fifth metatarsal. There are no lacerations or evidence of open fracture.      ------------------------- NURSING NOTES AND VITALS REVIEWED ---------------------------   The nursing notes within the ED encounter and vital signs as below have been reviewed by myself. /64   Pulse 83   Temp 97.3 °F (36.3 °C)   Resp 16   LMP 03/16/2021   SpO2 98%   Oxygen Saturation Interpretation: Normal    The patients available past medical records and past encounters were reviewed. -------------------------------------------------- RESULTS -------------------------------------------------  I have personally reviewed all laboratory and imaging results for this patient. Results are listed below. LABS:  No results found for this visit on 04/23/21. RADIOLOGY:  Interpreted by Radiologist.  XR ANKLE RIGHT (MIN 3 VIEWS)   Final Result   No acute abnormality of the ankle.                 ------------------------------ ED COURSE/MEDICAL DECISION MAKING----------------------  Medications   oxyCODONE-acetaminophen (PERCOCET) 5-325 MG per tablet 1 tablet (1 tablet Oral Given 4/23/21 2016)         ED COURSE:       Medical decision making: Right ankle injury with concerns for an ankle fracture based on physical exam. Films are obtained and are negative for fracture at this time. Plan is subsequently for symptom control limited weight-bearing and ankle immobilization. Patient made aware of negative x-rays. She will be discharged home with Ace wrap, ankle brace, crutches she was educated on rest, ice, compression and elevation. Patient was made aware of the importance of good follow-up care. Patient will be discharged home with meds for symptom relief. She was made aware to follow-up with her primary care physician within the next 3 days as well as when to return back to the emergency department. Patient nontoxic.   Patient expressed understanding and safely discharged home        Impression:   1) Ankle Sprain    Disposition:  Discharge    Condition:  Stable        BETHANY Garaz - CNP  04/24/21 2508

## 2021-04-25 ENCOUNTER — HOSPITAL ENCOUNTER (EMERGENCY)
Age: 21
Discharge: HOME | End: 2021-04-25
Attending: EMERGENCY MEDICINE
Payer: COMMERCIAL

## 2021-04-25 VITALS
RESPIRATION RATE: 14 BRPM | OXYGEN SATURATION: 98 % | TEMPERATURE: 97.5 F | WEIGHT: 150 LBS | HEIGHT: 59 IN | HEART RATE: 77 BPM | BODY MASS INDEX: 30.24 KG/M2 | SYSTOLIC BLOOD PRESSURE: 135 MMHG | DIASTOLIC BLOOD PRESSURE: 68 MMHG

## 2021-04-25 VITALS
RESPIRATION RATE: 18 BRPM | WEIGHT: 150 LBS | OXYGEN SATURATION: 97 % | BODY MASS INDEX: 30.24 KG/M2 | HEART RATE: 95 BPM | TEMPERATURE: 96.9 F | SYSTOLIC BLOOD PRESSURE: 114 MMHG | DIASTOLIC BLOOD PRESSURE: 64 MMHG | HEIGHT: 59 IN

## 2021-04-25 DIAGNOSIS — B02.9 HERPES ZOSTER WITHOUT COMPLICATION: Primary | ICD-10-CM

## 2021-04-25 DIAGNOSIS — B00.9 HERPES SIMPLEX INFECTION: Primary | ICD-10-CM

## 2021-04-25 LAB
HCG, URINE, POC: NEGATIVE
Lab: NORMAL
NEGATIVE QC PASS/FAIL: NORMAL
POSITIVE QC PASS/FAIL: NORMAL

## 2021-04-25 PROCEDURE — 96372 THER/PROPH/DIAG INJ SC/IM: CPT

## 2021-04-25 PROCEDURE — 6370000000 HC RX 637 (ALT 250 FOR IP): Performed by: STUDENT IN AN ORGANIZED HEALTH CARE EDUCATION/TRAINING PROGRAM

## 2021-04-25 PROCEDURE — 6360000002 HC RX W HCPCS: Performed by: STUDENT IN AN ORGANIZED HEALTH CARE EDUCATION/TRAINING PROGRAM

## 2021-04-25 PROCEDURE — 99282 EMERGENCY DEPT VISIT SF MDM: CPT

## 2021-04-25 PROCEDURE — 99283 EMERGENCY DEPT VISIT LOW MDM: CPT

## 2021-04-25 RX ORDER — IBUPROFEN 800 MG/1
800 TABLET ORAL EVERY 8 HOURS PRN
COMMUNITY
End: 2022-04-25 | Stop reason: ALTCHOICE

## 2021-04-25 RX ORDER — VALACYCLOVIR HYDROCHLORIDE 500 MG/1
500 TABLET, FILM COATED ORAL 3 TIMES DAILY
Qty: 15 TABLET | Refills: 0 | Status: SHIPPED | OUTPATIENT
Start: 2021-04-25 | End: 2022-01-04 | Stop reason: SDUPTHER

## 2021-04-25 RX ORDER — DEXAMETHASONE SODIUM PHOSPHATE 10 MG/ML
6 INJECTION, SOLUTION INTRAMUSCULAR; INTRAVENOUS ONCE
Status: COMPLETED | OUTPATIENT
Start: 2021-04-25 | End: 2021-04-25

## 2021-04-25 RX ORDER — VALACYCLOVIR HYDROCHLORIDE 500 MG/1
500 TABLET, FILM COATED ORAL ONCE
Status: COMPLETED | OUTPATIENT
Start: 2021-04-25 | End: 2021-04-25

## 2021-04-25 RX ADMIN — VALACYCLOVIR HYDROCHLORIDE 500 MG: 500 TABLET, FILM COATED ORAL at 20:10

## 2021-04-25 RX ADMIN — DEXAMETHASONE SODIUM PHOSPHATE 6 MG: 10 INJECTION, SOLUTION INTRAMUSCULAR; INTRAVENOUS at 19:39

## 2021-04-25 ASSESSMENT — ENCOUNTER SYMPTOMS
VOMITING: 0
WHEEZING: 0
COUGH: 0
EYE DISCHARGE: 0
SHORTNESS OF BREATH: 0
NAUSEA: 0
SINUS PRESSURE: 0
ABDOMINAL DISTENTION: 0
SORE THROAT: 0
EYE REDNESS: 0
DIARRHEA: 0
BACK PAIN: 0
EYE PAIN: 0

## 2021-04-25 ASSESSMENT — PAIN DESCRIPTION - LOCATION: LOCATION: EYE

## 2021-04-25 NOTE — ED PROVIDER NOTES
Procedures     MDM  Number of Diagnoses or Management Options  Herpes simplex infection  Diagnosis management comments: This is a 80-year-old female presents to the emergency department for her yearly outbreak of herpes simplex that occurs on the skin lateral to her left eye. She states that she has had these outbreaks since she was a baby, that they occur about once a year and typically resolve with a course of Valtrex. She was seen in the emergency department last night, did receive prescription for Valtrex but was unable to pick it up today because she slept too late and the pharmacies were closed. Patient has no involvement of the eye itself, no vision changes, negative Villa sign. Patient has no signs or symptoms of systemic infection. Patient given dose of Valtrex as well as dose of steroids here in the emergency department. Patient already has prescription for Valtrex sent to her pharmacy. patient was strongly encouraged to  her prescription tomorrow, to return the emergency department any new worsening symptoms including any vision changes. Patient agreeable with plan for discharge and outpatient follow-up and all questions were answered. --------------------------------------------- PAST HISTORY ---------------------------------------------  Past Medical History:  has a past medical history of Asthma and Herpes. Past Surgical History:  has no past surgical history on file. Social History:  reports that she has never smoked. She has never used smokeless tobacco. She reports that she does not drink alcohol or use drugs. Family History: family history includes Asthma in her brother, brother, father, sister, and sister; Cancer in her maternal grandmother; Drug Abuse in her maternal grandfather and maternal grandmother. The patients home medications have been reviewed.     Allergies: Patient has no known allergies. -------------------------------------------------- RESULTS -------------------------------------------------  Labs:  No results found for this visit on 04/25/21. Radiology:  No orders to display       ------------------------- NURSING NOTES AND VITALS REVIEWED ---------------------------  Date / Time Roomed:  4/25/2021  7:12 PM  ED Bed Assignment:  21/21    The nursing notes within the ED encounter and vital signs as below have been reviewed. /68   Pulse 77   Temp 97.5 °F (36.4 °C) (Oral)   Resp 14   Ht 4' 11\" (1.499 m)   Wt 150 lb (68 kg)   SpO2 98%   BMI 30.30 kg/m²   Oxygen Saturation Interpretation: Normal      ------------------------------------------ PROGRESS NOTES ------------------------------------------  7:31 PM EDT  I have spoken with the patient and discussed todays results, in addition to providing specific details for the plan of care and counseling regarding the diagnosis and prognosis. Their questions are answered at this time and they are agreeable with the plan. I discussed at length with them reasons for immediate return here for re evaluation. They will followup with their primary care physician by calling their office tomorrow. --------------------------------- ADDITIONAL PROVIDER NOTES ---------------------------------  At this time the patient is without objective evidence of an acute process requiring hospitalization or inpatient management. They have remained hemodynamically stable throughout their entire ED visit and are stable for discharge with outpatient follow-up. The plan has been discussed in detail and they are aware of the specific conditions for emergent return, as well as the importance of follow-up. Discharge Medication List as of 4/25/2021  8:00 PM          Diagnosis:  1. Herpes simplex infection        Disposition:  Patient's disposition: Discharge to home  Patient's condition is stable.            [address], DO  Resident  04/25/21 6633

## 2021-04-25 NOTE — ED PROVIDER NOTES
HPI:  4/25/21,   Time: 5:20 AM EDT         Maegan Doll is a 21 y.o. female presenting to the ED for noted herpetic lesions to left lateral eye, beginning yesterday ago. The complaint has been persistent, moderate in severity, and worsened by nothing. Patient reporting history of lesions to eye on the left side. Patient reports history of herpetic lesions in the past.  Patient reporting no visual disturbance. She reports no headache she reports no neck pain or fever she reports no chest pain or difficulty breathing or abdominal pain. Patient reporting no neck pain. Patient reports she has been dealing with this for years now. She reports no loss of vision or blurred vision. Patient reports that she has been on antivirals in the past for the same complaint. Patient reports she has no prescription did take 8 PM but did not have any other further medications    ROS:   Pertinent positives and negatives are stated within HPI, all other systems reviewed and are negative.  --------------------------------------------- PAST HISTORY ---------------------------------------------  Past Medical History:  has a past medical history of Asthma and Herpes. Past Surgical History:  has no past surgical history on file. Social History:  reports that she has never smoked. She has never used smokeless tobacco. She reports that she does not drink alcohol or use drugs. Family History: family history includes Asthma in her brother, brother, father, sister, and sister; Cancer in her maternal grandmother; Drug Abuse in her maternal grandfather and maternal grandmother. The patients home medications have been reviewed. Allergies: Patient has no known allergies.     -------------------------------------------------- RESULTS -------------------------------------------------  All laboratory and radiology results have been personally reviewed by myself   LABS:  No results found for this visit on 04/25/21. RADIOLOGY:  Interpreted by Radiologist.  No orders to display       ------------------------- NURSING NOTES AND VITALS REVIEWED ---------------------------   The nursing notes within the ED encounter and vital signs as below have been reviewed. /64   Pulse 95   Temp 96.9 °F (36.1 °C) (Temporal)   Resp 18   Ht 4' 11\" (1.499 m)   Wt 150 lb (68 kg)   LMP 03/16/2021   SpO2 97%   BMI 30.30 kg/m²   Oxygen Saturation Interpretation: Normal      ---------------------------------------------------PHYSICAL EXAM--------------------------------------      Constitutional/General: Alert and oriented x3, well appearing, non toxic in NAD  Head: NC/AT  Eyes: PERRL, EOMI, noted raised cluster-like lesions lateral eye lower lateral left eyelid  Mouth: Oropharynx clear, handling secretions, no trismus  Neck: Supple, full ROM, no meningeal signs  Pulmonary: Lungs clear to auscultation bilaterally, no wheezes, rales, or rhonchi. Not in respiratory distress  Cardiovascular:  Regular rate and rhythm, no murmurs, gallops, or rubs. 2+ distal pulses  Abdomen: Soft, non tender, non distended,   Extremities: Moves all extremities x 4. Aircast noted to ankle. Patient reports she had injury to her ankle several days ago. Warm and well perfused  Skin: warm and dry without rash  Neurologic: GCS 15,  Psych: Normal Affect      ------------------------------ ED COURSE/MEDICAL DECISION MAKING----------------------  Medications - No data to display      Medical Decision Making:    Patient reporting history of herpetic lesions to left eye. Patient reports no involvement of the eye itself. Its around the skin and eyelid region. Patient reports she has been on antivirals in the past for the same complaint. Patient will be discharged home with outpatient follow-up and will be placed on antiviral.  She is to return anytime for any further problems    Counseling:    The emergency provider has spoken with the patient and discussed todays results, in addition to providing specific details for the plan of care and counseling regarding the diagnosis and prognosis. Questions are answered at this time and they are agreeable with the plan.      --------------------------------- IMPRESSION AND DISPOSITION ---------------------------------    IMPRESSION  1.  Herpes zoster without complication        DISPOSITION  Disposition: Discharge to home  Patient condition is stable                  Deisy Marquis MD  04/25/21 1742

## 2021-11-16 ENCOUNTER — HOSPITAL ENCOUNTER (EMERGENCY)
Age: 21
Discharge: HOME | End: 2021-11-16
Payer: COMMERCIAL

## 2021-11-16 VITALS
TEMPERATURE: 98.4 F | RESPIRATION RATE: 16 BRPM | BODY MASS INDEX: 26.26 KG/M2 | OXYGEN SATURATION: 99 % | WEIGHT: 130 LBS | HEART RATE: 78 BPM | DIASTOLIC BLOOD PRESSURE: 70 MMHG | SYSTOLIC BLOOD PRESSURE: 114 MMHG

## 2021-11-16 DIAGNOSIS — N76.0 VAGINITIS AND VULVOVAGINITIS: Primary | ICD-10-CM

## 2021-11-16 LAB
HCG, URINE, POC: NEGATIVE
Lab: NORMAL
NEGATIVE QC PASS/FAIL: NORMAL
POSITIVE QC PASS/FAIL: NORMAL

## 2021-11-16 PROCEDURE — 99281 EMR DPT VST MAYX REQ PHY/QHP: CPT

## 2021-11-16 RX ORDER — FLUCONAZOLE 150 MG/1
150 TABLET ORAL ONCE
Qty: 2 TABLET | Refills: 0 | Status: SHIPPED | OUTPATIENT
Start: 2021-11-16 | End: 2021-11-24 | Stop reason: SDUPTHER

## 2021-11-16 RX ORDER — METRONIDAZOLE 7.5 MG/G
1 GEL VAGINAL DAILY
Qty: 25 G | Refills: 0 | Status: SHIPPED | OUTPATIENT
Start: 2021-11-16 | End: 2021-11-21

## 2021-11-16 NOTE — ED PROVIDER NOTES
She reports that she does not drink alcohol and does not use drugs. Family History: family history includes Asthma in her brother, brother, father, sister, and sister; Cancer in her maternal grandmother; Drug Abuse in her maternal grandfather and maternal grandmother. Allergies: Patient has no known allergies. PHYSICAL EXAM   Oxygen Saturation Interpretation: Normal on room air analysis. ED Triage Vitals   BP Temp Temp Source Pulse Resp SpO2 Height Weight   11/16/21 1516 11/16/21 1501 11/16/21 1516 11/16/21 1516 11/16/21 1516 11/16/21 1516 -- 11/16/21 1516   114/70 97.7 °F (36.5 °C) Oral 78 16 99 %  130 lb (59 kg)         Physical Exam  Constitutional/General: Alert and oriented x3, well appearing, non toxic  HEENT:  NC/NT. PERRLA,  Airway patent. Neck: Supple, full ROM, non tender to palpation in the midline, no stridor, no crepitus, no meningeal signs  Respiratory: Lungs clear to auscultation bilaterally, no wheezes, rales, or rhonchi. Not in respiratory distress  CV:  Regular rate. Regular rhythm. No murmurs, gallops, or rubs. 2+ distal pulses  Chest: No chest wall tenderness  GI:  Abdomen Soft, Non tender, Non distended. +BS. No rebound, guarding, or rigidity. No pulsatile masses. : Patient declined pelvic exam  Musculoskeletal: Moves all extremities x 4. Warm and well perfused, no clubbing, cyanosis, or edema. Capillary refill <3 seconds  Integument: skin warm and dry. No rashes.    Lymphatic: no lymphadenopathy noted  Neurologic: GCS 15, no focal deficits, symmetric strength 5/5 in the upper and lower extremities bilaterally  Psychiatric: Normal Affect    Lab / Imaging Results   (All laboratory and radiology results have been personally reviewed by myself)  Labs:  Results for orders placed or performed during the hospital encounter of 11/16/21   POC Pregnancy Urine Qual   Result Value Ref Range    HCG, Urine, POC Negative Negative    Lot Number [id number]     Positive QC Pass/Fail Acceptable     Negative QC Pass/Fail Acceptable      Imaging: All Radiology results interpreted by Radiologist unless otherwise noted. No orders to display       ED Course / Medical Decision Making   Medications - No data to display      Consult(s):   None    Procedure(s):   none    MDM:   Patient is a 42-year-old female that presented to the emergency department with vaginal itching. Patient states that she has slight \"soreness of the area. \"  Patient denies any ulcerations or masses or bumps. Patient has no concern for herpes. Patient states that she does not want tested for STDs and has no concern for sexually transmitted disease. Patient states that she is being checked in a few weeks anyways. Patient states that she was checked 4 months ago for STDs. Patient states she is in a monogamous relationship. Patient again offered pelvic exam in order for me to determine the type of infection she has but again declined. Patient declined again full treatment for STDs. Patient was advised how important this is but still again declined. Patient states that she will do a urine for pregnancy test but has no concern for a UTI. Patient denies any urgency, frequency or burning. Patient denies any fever chills abdominal pain or flank pain. Patient will be treated today for a yeast infection as well as bacterial infection. Patient is advised the risk, benefits and side effects of the medication. Patient was advised of the risks of just treating blindly for an infection that we are unable to test for because she is declining a pelvic. Patient voiced understanding and agreed with the plan and management. Patient will be treated with MetroGel for the next 5 days and told no intercourse as well as a Diflucan pill. Patient was told to take the Diflucan today and at the completion of her antibiotics take the second.   Patient was advised to follow-up with her OB/GYN to have proper management of this vaginal discharge/concern. Patient with partner present today. Patient currently denying any headaches, blurry vision, chest pain, shortness breath, fever, chills, nausea, vomiting, scrotal pain, change in bowel or bladder movements. Patient stable for outpatient follow-up. Plan of Care/Counseling:  Wan Raymond PA-C reviewed today's visit with the patient in addition to providing specific details for the plan of care and counseling regarding the diagnosis and prognosis. Questions are answered at this time and are agreeable with the plan. ASSESSMENT     1. Vaginitis and vulvovaginitis      PLAN   Discharged home. Patient condition is stable    New Medications     New Prescriptions    FLUCONAZOLE (DIFLUCAN) 150 MG TABLET    Take 1 tablet by mouth once for 1 dose May repeat in 3-5 days, if symptoms persist or recur. METRONIDAZOLE (METROGEL VAGINAL) 0.75 % VAGINAL GEL    Place 1 Applicatorful vaginally daily for 5 days     Electronically signed by Wan Raymond PA-C   DD: 11/16/21  **This report was transcribed using voice recognition software. Every effort was made to ensure accuracy; however, inadvertent computerized transcription errors may be present.   END OF ED PROVIDER NOTE        Wan Raymond PA-C  11/16/21 154

## 2021-11-26 VITALS
RESPIRATION RATE: 18 BRPM | WEIGHT: 130 LBS | SYSTOLIC BLOOD PRESSURE: 115 MMHG | OXYGEN SATURATION: 98 % | DIASTOLIC BLOOD PRESSURE: 58 MMHG | HEIGHT: 59 IN | TEMPERATURE: 97.2 F | BODY MASS INDEX: 26.21 KG/M2 | HEART RATE: 63 BPM

## 2021-11-26 ASSESSMENT — PAIN DESCRIPTION - LOCATION: LOCATION: VAGINA

## 2021-11-26 ASSESSMENT — PAIN SCALES - GENERAL: PAINLEVEL_OUTOF10: 5

## 2021-11-26 ASSESSMENT — PAIN DESCRIPTION - PAIN TYPE: TYPE: ACUTE PAIN

## 2021-11-26 ASSESSMENT — PAIN DESCRIPTION - FREQUENCY: FREQUENCY: CONTINUOUS

## 2021-11-26 ASSESSMENT — PAIN DESCRIPTION - DESCRIPTORS: DESCRIPTORS: DISCOMFORT

## 2021-11-27 ENCOUNTER — HOSPITAL ENCOUNTER (EMERGENCY)
Age: 21
Discharge: HOME | End: 2021-11-27

## 2021-11-27 ENCOUNTER — HOSPITAL ENCOUNTER (EMERGENCY)
Age: 21
Discharge: HOME | End: 2021-11-27
Payer: COMMERCIAL

## 2021-11-27 VITALS
DIASTOLIC BLOOD PRESSURE: 68 MMHG | HEIGHT: 59 IN | HEART RATE: 77 BPM | RESPIRATION RATE: 16 BRPM | OXYGEN SATURATION: 99 % | TEMPERATURE: 98.4 F | BODY MASS INDEX: 26.21 KG/M2 | WEIGHT: 130 LBS | SYSTOLIC BLOOD PRESSURE: 118 MMHG

## 2021-11-27 DIAGNOSIS — N76.0 VAGINITIS AND VULVOVAGINITIS: Primary | ICD-10-CM

## 2021-11-27 LAB
BACTERIA: ABNORMAL /HPF
BILIRUBIN URINE: NEGATIVE
BLOOD, URINE: NEGATIVE
CLARITY: CLEAR
COLOR: YELLOW
EPITHELIAL CELLS, UA: ABNORMAL /HPF
GLUCOSE URINE: NEGATIVE MG/DL
KETONES, URINE: NEGATIVE MG/DL
LEUKOCYTE ESTERASE, URINE: ABNORMAL
NITRITE, URINE: NEGATIVE
PH UA: 7 (ref 5–9)
PROTEIN UA: NEGATIVE MG/DL
RBC UA: ABNORMAL /HPF (ref 0–2)
SPECIFIC GRAVITY UA: 1.02 (ref 1–1.03)
UROBILINOGEN, URINE: 0.2 E.U./DL
WBC UA: ABNORMAL /HPF (ref 0–5)

## 2021-11-27 PROCEDURE — 99283 EMERGENCY DEPT VISIT LOW MDM: CPT

## 2021-11-27 PROCEDURE — 81001 URINALYSIS AUTO W/SCOPE: CPT

## 2021-11-27 PROCEDURE — 87088 URINE BACTERIA CULTURE: CPT

## 2021-11-27 PROCEDURE — 6370000000 HC RX 637 (ALT 250 FOR IP): Performed by: PHYSICIAN ASSISTANT

## 2021-11-27 RX ORDER — FLUCONAZOLE 100 MG/1
150 TABLET ORAL ONCE
Status: COMPLETED | OUTPATIENT
Start: 2021-11-27 | End: 2021-11-27

## 2021-11-27 RX ORDER — FLUCONAZOLE 150 MG/1
150 TABLET ORAL ONCE
Qty: 1 TABLET | Refills: 0 | Status: SHIPPED | OUTPATIENT
Start: 2021-11-27 | End: 2022-04-29 | Stop reason: SDUPTHER

## 2021-11-27 RX ADMIN — FLUCONAZOLE 150 MG: 100 TABLET ORAL at 18:51

## 2021-11-27 NOTE — ED NOTES
FIRST PROVIDER CONTACT ASSESSMENT NOTE      Department of Emergency Medicine   11/26/21  8:15 PM EST    Chief Complaint: Vaginal Discharge (Pt stated it started a week ago. Pt was seen downtown three days ago)      History of Present Illness:   Norma Arita is a 21 y.o. female who presents to the ED for    Medical History:  has a past medical history of Asthma and Herpes. Surgical History:  has no past surgical history on file. Social History:  reports that she has never smoked. She has never used smokeless tobacco. She reports that she does not drink alcohol and does not use drugs. Family History: family history includes Asthma in her brother, brother, father, sister, and sister; Cancer in her maternal grandmother; Drug Abuse in her maternal grandfather and maternal grandmother. *ALLERGIES*     Patient has no known allergies. Physical Exam:      VS:  There were no vitals taken for this visit.      Initial Plan of Care:  Initiate Treatment-Testing, Proceed toTreatment Area When Bed Available for ED Attending/MLP to Continue Care    -----------------END OF FIRST PROVIDER CONTACT ASSESSMENT NOTE--------------  Electronically signed by BETHANY Gibbs CNP   DD: 11/26/21             BETHANY Lockhart CNP  11/26/21 2015

## 2021-11-27 NOTE — ED PROVIDER NOTES
Independent [facility]    HPI:  11/27/21, Time: 6:34 PM ABRIL Pearson is a 21 y.o. female presenting to the ED for vaginal irritation, consistent with her normal yeast infection, beginning 2 days ago. The complaint has been persistent, moderate in severity, and worsened by nothing. Patient reports that she was having similar symptom several days ago. She was started on vaginal metronidazole as well as given a dose of Diflucan. States that she finished this and called her OB/GYN who then prescribed oral metronidazole. She took this for 2 days and then reports that her symptoms flared up again. Denies any rashes a vaginal irritation and white discharge. Denies any new sexual partners or concerns for STD. Denies any rash. She has been afebrile without recent travel or sick contacts. No abdominal or back pain. Patient denies all other symptoms at this time. Review of Systems:   A complete review of systems was performed and pertinent positives and negatives are stated within HPI, all other systems reviewed and are negative.          --------------------------------------------- PAST HISTORY ---------------------------------------------  Past Medical History:  has a past medical history of Asthma and Herpes. Past Surgical History:  has no past surgical history on file. Social History:  reports that she has never smoked. She has never used smokeless tobacco. She reports that she does not drink alcohol and does not use drugs. Family History: family history includes Asthma in her brother, brother, father, sister, and sister; Cancer in her maternal grandmother; Drug Abuse in her maternal grandfather and maternal grandmother. The patients home medications have been reviewed. Allergies: Patient has no known allergies.     -------------------------------------------------- RESULTS -------------------------------------------------  All laboratory and radiology results have been personally reviewed by myself   LABS:  Results for orders placed or performed during the hospital encounter of 11/27/21   Urinalysis, reflex to microscopic   Result Value Ref Range    Color, UA Yellow Straw/Yellow    Clarity, UA Clear Clear    Glucose, Ur Negative Negative mg/dL    Bilirubin Urine Negative Negative    Ketones, Urine Negative Negative mg/dL    Specific Gravity, UA 1.020 1.005 - 1.030    Blood, Urine Negative Negative    pH, UA 7.0 5.0 - 9.0    Protein, UA Negative Negative mg/dL    Urobilinogen, Urine 0.2 <2.0 E.U./dL    Nitrite, Urine Negative Negative    Leukocyte Esterase, Urine TRACE (A) Negative       RADIOLOGY:  Interpreted by Radiologist.  No orders to display       ------------------------- NURSING NOTES AND VITALS REVIEWED ---------------------------   The nursing notes within the ED encounter and vital signs as below have been reviewed. /68   Pulse 77   Temp 98.4 °F (36.9 °C) (Oral)   Resp 16   Ht 4' 11\" (1.499 m)   Wt 130 lb (59 kg)   SpO2 99%   BMI 26.26 kg/m²   Oxygen Saturation Interpretation: Normal      ---------------------------------------------------PHYSICAL EXAM--------------------------------------      Constitutional/General: Alert and oriented x3, well appearing, non toxic in NAD  Head: Normocephalic and atraumatic  Eyes: PERRL, EOMI  Mouth: Oropharynx clear, handling secretions, no trismus  Neck: Supple, full ROM,   Pulmonary: Lungs clear to auscultation bilaterally, no wheezes, rales, or rhonchi. Not in respiratory distress  Cardiovascular:  Regular rate and rhythm, no murmurs, gallops, or rubs. 2+ distal pulses  Abdomen: Soft, non tender, non distended, pelvic exam declined per patient. Extremities: Moves all extremities x 4.  Warm and well perfused  Skin: warm and dry without rash  Neurologic: GCS 15,  Psych: Normal Affect      ------------------------------ ED COURSE/MEDICAL DECISION MAKING----------------------  Medications   fluconazole (DIFLUCAN) tablet 150 mg (150 mg Oral Given 11/27/21 1851)         ED COURSE:  ED Course as of 11/27/21 1937   Sat Nov 27, 2021   Froylan 1978 Patient declines pelvic exam in the ED today. Reports she has a follow up appointment with her OBGYN in 2 days. [MS]      ED Course User Index  [MS] Estelita Calix       Medical Decision Making:    Patient is a 70-year-old female presenting emergency department with vaginal irritation which she reports is consistent with her typical yeast infection. She declines pelvic exam in the emergency department but does report that she has an OB/GYN appointment in 2 days that she will follow up with and have a pelvic exam at that time. She is agreeable to providing a urine sample, particularly for urine culture. At this time we will treat with Diflucan. She has since discontinued the oral metronidazole. Advised to follow-up with PCP and/or OB/GYN for recheck and return the emergency department with any new or worsening symptoms. Patient voiced understanding is agreeable to the above treatment plan. Counseling: The emergency provider has spoken with the patient and discussed todays results, in addition to providing specific details for the plan of care and counseling regarding the diagnosis and prognosis. Questions are answered at this time and they are agreeable with the plan.      --------------------------------- IMPRESSION AND DISPOSITION ---------------------------------    IMPRESSION  1. Vaginitis and vulvovaginitis        DISPOSITION  Disposition: Discharge to home  Patient condition is stable      NOTE: This report was transcribed using voice recognition software.  Every effort was made to ensure accuracy; however, inadvertent computerized transcription errors may be present        Estelita Calix  11/27/21 1938

## 2021-11-29 LAB — URINE CULTURE, ROUTINE: NORMAL

## 2021-12-01 ENCOUNTER — OFFICE VISIT (OUTPATIENT)
Dept: FAMILY MEDICINE CLINIC | Age: 21
End: 2021-12-01
Payer: COMMERCIAL

## 2021-12-01 VITALS
TEMPERATURE: 98.3 F | DIASTOLIC BLOOD PRESSURE: 60 MMHG | OXYGEN SATURATION: 98 % | BODY MASS INDEX: 24.6 KG/M2 | WEIGHT: 122 LBS | SYSTOLIC BLOOD PRESSURE: 88 MMHG | HEIGHT: 59 IN | HEART RATE: 86 BPM

## 2021-12-01 DIAGNOSIS — B37.9 YEAST INFECTION: Primary | ICD-10-CM

## 2021-12-01 LAB
BILIRUBIN, POC: NORMAL
BLOOD URINE, POC: NEGATIVE
CLARITY, POC: NORMAL
COLOR, POC: YELLOW
GLUCOSE URINE, POC: NEGATIVE
KETONES, POC: NEGATIVE
LEUKOCYTE EST, POC: NEGATIVE
NITRITE, POC: NEGATIVE
PH, POC: 6
PROTEIN, POC: NEGATIVE
SPECIFIC GRAVITY, POC: >=1.03
UROBILINOGEN, POC: 0.2

## 2021-12-01 PROCEDURE — G8484 FLU IMMUNIZE NO ADMIN: HCPCS | Performed by: FAMILY MEDICINE

## 2021-12-01 PROCEDURE — G8427 DOCREV CUR MEDS BY ELIG CLIN: HCPCS | Performed by: FAMILY MEDICINE

## 2021-12-01 PROCEDURE — 99213 OFFICE O/P EST LOW 20 MIN: CPT | Performed by: FAMILY MEDICINE

## 2021-12-01 PROCEDURE — 1036F TOBACCO NON-USER: CPT | Performed by: FAMILY MEDICINE

## 2021-12-01 PROCEDURE — 81002 URINALYSIS NONAUTO W/O SCOPE: CPT | Performed by: FAMILY MEDICINE

## 2021-12-01 PROCEDURE — G8420 CALC BMI NORM PARAMETERS: HCPCS | Performed by: FAMILY MEDICINE

## 2021-12-01 PROCEDURE — 99212 OFFICE O/P EST SF 10 MIN: CPT | Performed by: FAMILY MEDICINE

## 2021-12-01 NOTE — PATIENT INSTRUCTIONS
control. The oil in some vaginal medicines weakens latex. · Don't douche. When should you call for help? Call your doctor now or seek immediate medical care if:    · You have unexpected vaginal bleeding.     · You have new or increased pain in your vagina or pelvis. Watch closely for changes in your health, and be sure to contact your doctor if:    · You have a fever.     · You are not getting better after 2 days.     · Your symptoms come back after you finish your medicines. Where can you learn more? Go to [email or web address] and sign in to your [employer] account. Enter B113 in the [employer] box to learn more about \"Vaginal Yeast Infection: Care Instructions. \"     If you do not have an account, please click on the \"Sign Up Now\" link. Current as of: February 11, 2021               Content Version: 13.0  © 2006-2021 [employer]wise, Incorporated. Care instructions adapted under license by [facility] ([facility]). If you have questions about a medical condition or this instruction, always ask your healthcare professional. [address] any warranty or liability for your use of this information.

## 2021-12-01 NOTE — PROGRESS NOTES
S: 21 y.o. female here for vaginitis. In ER given fluconazole and metronidazole ointment. Went back to ER on 27th and got double dose, and since then much better and no more foul smell, just a little skin irritation. Declined  exam  Hygiene counseling provided. O: VS: BP 88/60   Pulse 86   Temp 98.3 °F (36.8 °C) (Temporal)   Ht 4' 11\" (1.499 m)   Wt 122 lb (55.3 kg)   LMP 11/11/2021 (Exact Date)   SpO2 98%   BMI 24.64 kg/m²    General: NAD, alert and interacting appropriately. Impression: vaginitis, resolving  Plan:   Counseling provided  CTM  Declined flu shot    Attending Physician Statement  I have discussed the case, including pertinent history and exam findings with the resident. I agree with the documented assessment and plan.

## 2021-12-01 NOTE — PROGRESS NOTES
[facility] MEDICINE RESIDENCY PROGRAM  DATE OF VISIT : 2021    Patient : Mariel Sol   Age : 21 y.o.  : 2000   MRN : [id number]   ______________________________________________________________________    Chief Complaint :   Chief Complaint   Patient presents with    Vaginitis     pt was in ED, still having irritation       HPI : Mariel Sol is 21 y.o. female who presented to the clinic today for vaginal irritation. Yeast vaginitis: patient was seen in ED  was given fluconazole day (2 doses- she only took one of those) and metronidazole cream. She was seen again on  for no improvement of her symptoms, she was given fluconazole in ED. No symptoms are significantly improved since ED visit on . No further vaginal discharge but does endorse residual irritation that seems to be improving. I reviewed the patient's past medications, allergies and past medical history during this visit. Past Medical History :        Past Medical History:   Diagnosis Date    Asthma     Herpes      No past surgical history on file.     Social History :  Social History     Tobacco History     Smoking Status  Never Smoker    Smokeless Tobacco Use  Never Used          Alcohol History     Alcohol Use Status  No          Drug Use     Drug Use Status  No          Sexual Activity     Sexually Active  Yes Partners  Male                 Allergies :   No Known Allergies    Medication List :    Current Outpatient Medications   Medication Sig Dispense Refill    metroNIDAZOLE (FLAGYL) 500 MG tablet Take 1 tablet by mouth 2 times daily for 7 days (Patient not taking: Reported on 2021) 14 tablet 0    fluconazole (DIFLUCAN) 150 MG tablet Take 1 tablet by mouth daily Take one tablet by mouth now and repeat once flagyl is completed (Patient not taking: Reported on 2021) 2 tablet 0    ibuprofen (ADVIL;MOTRIN) 800 MG tablet Take 800 mg by mouth every 8 hours as needed for Pain (Patient not taking: Reported on 12/1/2021)       No current facility-administered medications for this visit. Review of Systems :  Review of Systems   Constitutional: Negative for chills, fatigue and fever. Genitourinary: Negative for difficulty urinating, dyspareunia, dysuria, flank pain, frequency, hematuria, urgency, vaginal bleeding, vaginal discharge and vaginal pain.     ______________________________________________________________________    Physical Exam :    Vitals: BP 88/60   Pulse 86   Temp 98.3 °F (36.8 °C) (Temporal)   Ht 4' 11\" (1.499 m)   Wt 122 lb (55.3 kg)   LMP 11/11/2021 (Exact Date)   SpO2 98%   BMI 24.64 kg/m²   Physical Exam  Vitals reviewed. Constitutional:       General: She is not in acute distress. Appearance: Normal appearance. Cardiovascular:      Rate and Rhythm: Normal rate and regular rhythm. Pulses: Normal pulses. Heart sounds: Normal heart sounds. No murmur heard. Pulmonary:      Effort: No respiratory distress. Breath sounds: Normal breath sounds. No wheezing. Abdominal:      General: Bowel sounds are normal. There is no distension. Palpations: Abdomen is soft. Tenderness: There is no abdominal tenderness. Musculoskeletal:      Right lower leg: No edema. Left lower leg: No edema. Neurological:      Mental Status: She is alert.         ___________________    Assessment & Plan :    1. Yeast infection  - discussed appropriate hygiene  - avoid irritants  - POCT Urinalysis no Micro- normal      Educational materials and/or home exercises printed for patient's review and were included in patient instructions on his/her After Visit Summary and given to patient at the end of visit. Counseled regarding above diagnosis, including possible risks and complications,  especially if left uncontrolled.      Counseled regarding the possible side effects, risks, benefits and alternatives to treatment; patient and/or guardian verbalizes understanding, agrees, feels comfortable with and wishes to proceed with above treatment plan. Advised patient to call with any new medication issues, and read all Rx info from pharmacy to assure aware of all possible risks and side effects of medication before taking. Reviewed age and gender appropriate health screening exams and vaccinations. Advised patient regarding importance of keeping up with recommended health maintenance and to schedule as soon as possible if overdue, as this is important in assessing for undiagnosed pathology, especially cancer, as well as protecting against potentially harmful/life threatening disease. Patient and/or guardian verbalizes understanding and agrees with above counseling, assessment and plan. All questions answered    Additional plan and future considerations:   RTO PRN    Return to Office: No follow-ups on file.     Jory Blizzard, MD   Case discussed with Dr. Liset Del Rio

## 2022-01-04 ENCOUNTER — E-VISIT (OUTPATIENT)
Dept: PRIMARY CARE CLINIC | Age: 22
End: 2022-01-04
Payer: COMMERCIAL

## 2022-01-04 DIAGNOSIS — B00.9 HSV-1 INFECTION: Primary | ICD-10-CM

## 2022-01-04 PROCEDURE — 99422 OL DIG E/M SVC 11-20 MIN: CPT | Performed by: NURSE PRACTITIONER

## 2022-01-04 RX ORDER — VALACYCLOVIR HYDROCHLORIDE 500 MG/1
500 TABLET, FILM COATED ORAL 3 TIMES DAILY
Qty: 15 TABLET | Refills: 0 | Status: SHIPPED | OUTPATIENT
Start: 2022-01-04 | End: 2022-04-25 | Stop reason: SDUPTHER

## 2022-01-04 NOTE — PROGRESS NOTES
Reviewed questionnaire and photo  Reviewed previous encounters, meds, allergies and history    Dx hsv lesion of face    Plan  rx for valtrex x 7 days    Limited information in questionnaire.  No mention of visual changes    Please f/u with pcp if sx do not improve    See educational handout    Time spent 11-20

## 2022-01-06 ENCOUNTER — HOSPITAL ENCOUNTER (EMERGENCY)
Age: 22
Discharge: HOME | End: 2022-01-06
Payer: COMMERCIAL

## 2022-01-06 VITALS
DIASTOLIC BLOOD PRESSURE: 74 MMHG | BODY MASS INDEX: 26.21 KG/M2 | TEMPERATURE: 98.2 F | HEIGHT: 59 IN | SYSTOLIC BLOOD PRESSURE: 116 MMHG | RESPIRATION RATE: 16 BRPM | OXYGEN SATURATION: 100 % | WEIGHT: 130 LBS | HEART RATE: 82 BPM

## 2022-01-06 DIAGNOSIS — L03.213 PERIORBITAL CELLULITIS OF LEFT EYE: Primary | ICD-10-CM

## 2022-01-06 PROCEDURE — 87070 CULTURE OTHR SPECIMN AEROBIC: CPT

## 2022-01-06 PROCEDURE — 6370000000 HC RX 637 (ALT 250 FOR IP): Performed by: NURSE PRACTITIONER

## 2022-01-06 PROCEDURE — 99283 EMERGENCY DEPT VISIT LOW MDM: CPT

## 2022-01-06 RX ORDER — AMOXICILLIN AND CLAVULANATE POTASSIUM 875; 125 MG/1; MG/1
1 TABLET, FILM COATED ORAL 2 TIMES DAILY
Qty: 14 TABLET | Refills: 0 | Status: SHIPPED | OUTPATIENT
Start: 2022-01-06 | End: 2022-01-13

## 2022-01-06 RX ORDER — AMOXICILLIN AND CLAVULANATE POTASSIUM 875; 125 MG/1; MG/1
1 TABLET, FILM COATED ORAL ONCE
Status: COMPLETED | OUTPATIENT
Start: 2022-01-06 | End: 2022-01-06

## 2022-01-06 RX ADMIN — AMOXICILLIN AND CLAVULANATE POTASSIUM 1 TABLET: 875; 125 TABLET, FILM COATED ORAL at 22:45

## 2022-01-06 ASSESSMENT — PAIN SCALES - GENERAL: PAINLEVEL_OUTOF10: 5

## 2022-01-06 ASSESSMENT — PAIN DESCRIPTION - LOCATION: LOCATION: EYE

## 2022-01-06 ASSESSMENT — PAIN DESCRIPTION - ORIENTATION: ORIENTATION: LEFT

## 2022-01-07 NOTE — ED PROVIDER NOTES
[address] [facility]  Department of Emergency Medicine   ED  Encounter Note  Admit Date/RoomTime: No admission date for patient encounter. ED Room: Room/bed info not found  NAME: Cody Chaney  : 2000  MRN: [id number]     Chief Complaint:  Facial Swelling (pt reports herpes simplex to left eye. hx of issue)    History of Present Illness       Cody Chaney is a 24 y.o. old female who presents to the emergency department by private vehicle, for gradual onset red, raised, painful and weeping area on left lower periorbital region which began 1 week(s) prior to arrival.  The symptoms were caused by history of herpes simplex infections in this area. Since onset the symptoms have been gradually worsening. Patient was evaluated by her primary care provider and given refill on her Valtrex. She states that she is taking this without improvement. Her symptoms are associated with nothing additional and relieved by none. She denies any headache, neck pain, sore throat, otalgia, blurred vision, eye pain, painful eye movement, restricted eye movement, chest pain, shortness of breath, cough, fever, chills, abdominal pain, nausea, vomiting, diarrhea, constipation, dysuria, or signs of pregnancy. ROS   Pertinent positives and negatives are stated within HPI, all other systems reviewed and are negative. Past Medical History:  has a past medical history of Asthma and Herpes. Surgical History:  has no past surgical history on file. Social History:  reports that she has never smoked. She has never used smokeless tobacco. She reports that she does not drink alcohol and does not use drugs. Family History: family history includes Asthma in her brother, brother, father, sister, and sister; Cancer in her maternal grandmother; Drug Abuse in her maternal grandfather and maternal grandmother. Allergies: Patient has no known allergies.     Physical Exam   Oxygen Saturation Interpretation: Normal.        ED Triage Vitals [01/06/22 2237]   BP Temp Temp src Pulse Resp SpO2 Height Weight   116/74 98.2 °F (36.8 °C) -- 82 16 100 % 4' 11\" (1.499 m) 130 lb (59 kg)         Constitutional:  Alert, development consistent with age. HEENT:  NC/NT. Airway patent. Eyes:  PERRL, EOMI, no discharge. No pain with extraocular eye movements. No erythema of the sclera. Visual acuity within normal limits. Ears:  TMs without perforation, injection, or bulging. External canals clear without exudate. Mouth:  Mucous membranes moist without lesions, tongue and gums normal.  Throat:  Pharynx without injection, exudate, or tonsillar hypertrophy. Airway patient. Neck:  Supple. No lymphadenopathy. Respiratory:  Clear to auscultation and breath sounds equal.  CV:  Regular rate and rhythm. GI:  Abdomen Soft, nontender, +BS. Skin:  Skin turgor: Normal.              Moderate edema and erythema to the left inferior periorbital region with several vesicular lesions and trace purulent drainage. Area is tender to touch. Negative Villa sign. Lymphatics: No lymphangitis or adenopathy noted. Neurological:  Orientation age-appropriate unless noted elseware. Motor functions intact. Lab / Imaging Results   (All laboratory and radiology results have been personally reviewed by myself)  Labs:  No results found for this visit on 01/06/22. Imaging: All Radiology results interpreted by Radiologist unless otherwise noted. No orders to display       ED Course / Medical Decision Making     Medications   amoxicillin-clavulanate (AUGMENTIN) 875-125 MG per tablet 1 tablet (1 tablet Oral Given 1/6/22 2648)        Consults:   None    Procedures:   none    MDM:   Patient presented with a history of herpes simplex infections on her face with acute appearance of herpes simplex on her left inferior periorbital region. There is tender with trace amount of purulent drainage.   There do not appear to be any ocular involvement. Extraocular eye movements were intact and there was no pain with extraocular eye movements. There was no erythema of the sclera. There is negative Villa sign. She appeared well and nontoxic. There is no signs of systemic illness. Visual acuity was intact. She is currently on Valtrex prescribed by her primary care provider. She is initiated on Augmentin for periorbital cellulitis. A wound culture was sent. There is no inpatient criteria at this time. Patient was counseled on when to return to the emergency department with special attention to any restriction or pain with extraocular eye movements or changes in vision. She is instructed to return to the emergency department immediately with any signs of systemic illness such as fever. Patient verbalizes understanding and agrees with this plan. Instructed her to call her primary care provider tomorrow morning for close follow-up. Plan of Care/Counseling:  BETHANY Houston CNP reviewed today's visit with the patient and spouse / life partner in addition to providing specific details for the plan of care and counseling regarding the diagnosis and prognosis. Questions are answered at this time and are agreeable with the plan. Assessment      1. Periorbital cellulitis of left eye      Plan   Discharged home. Patient condition is good    New Medications     New Prescriptions    AMOXICILLIN-CLAVULANATE (AUGMENTIN) 875-125 MG PER TABLET    Take 1 tablet by mouth 2 times daily for 7 days     Electronically signed by BETHANY Houston CNP   DD: 1/6/22  **This report was transcribed using voice recognition software. Every effort was made to ensure accuracy; however, inadvertent computerized transcription errors may be present.   END OF ED PROVIDER NOTE         BETHANY Chao CNP  01/06/22 0026

## 2022-01-09 LAB — WOUND/ABSCESS: NORMAL

## 2022-04-25 ENCOUNTER — HOSPITAL ENCOUNTER (EMERGENCY)
Age: 22
Discharge: HOME | End: 2022-04-25
Payer: COMMERCIAL

## 2022-04-25 VITALS
RESPIRATION RATE: 16 BRPM | HEIGHT: 59 IN | DIASTOLIC BLOOD PRESSURE: 67 MMHG | OXYGEN SATURATION: 98 % | WEIGHT: 125 LBS | TEMPERATURE: 97.9 F | HEART RATE: 93 BPM | SYSTOLIC BLOOD PRESSURE: 105 MMHG | BODY MASS INDEX: 25.2 KG/M2

## 2022-04-25 DIAGNOSIS — B02.8 HERPES ZOSTER WITH COMPLICATION: ICD-10-CM

## 2022-04-25 DIAGNOSIS — L03.213 PRESEPTAL CELLULITIS OF LEFT EYE: Primary | ICD-10-CM

## 2022-04-25 PROCEDURE — 99283 EMERGENCY DEPT VISIT LOW MDM: CPT

## 2022-04-25 PROCEDURE — 6370000000 HC RX 637 (ALT 250 FOR IP): Performed by: NURSE PRACTITIONER

## 2022-04-25 RX ORDER — DOXYCYCLINE HYCLATE 100 MG
100 TABLET ORAL 2 TIMES DAILY
Qty: 20 TABLET | Refills: 0 | Status: SHIPPED | OUTPATIENT
Start: 2022-04-25 | End: 2022-05-05

## 2022-04-25 RX ORDER — VALACYCLOVIR HYDROCHLORIDE 500 MG/1
500 TABLET, FILM COATED ORAL 3 TIMES DAILY
Qty: 15 TABLET | Refills: 0 | Status: SHIPPED
Start: 2022-04-25 | End: 2022-04-25

## 2022-04-25 RX ORDER — IBUPROFEN 600 MG/1
600 TABLET ORAL EVERY 6 HOURS PRN
Qty: 20 TABLET | Refills: 0 | Status: SHIPPED | OUTPATIENT
Start: 2022-04-25 | End: 2022-04-30

## 2022-04-25 RX ORDER — DOXYCYCLINE HYCLATE 100 MG/1
100 CAPSULE ORAL ONCE
Status: COMPLETED | OUTPATIENT
Start: 2022-04-25 | End: 2022-04-25

## 2022-04-25 RX ORDER — HYDROCODONE BITARTRATE AND ACETAMINOPHEN 5; 325 MG/1; MG/1
1 TABLET ORAL ONCE
Status: COMPLETED | OUTPATIENT
Start: 2022-04-25 | End: 2022-04-25

## 2022-04-25 RX ORDER — TETRACAINE HYDROCHLORIDE 5 MG/ML
2 SOLUTION OPHTHALMIC ONCE
Status: COMPLETED | OUTPATIENT
Start: 2022-04-25 | End: 2022-04-25

## 2022-04-25 RX ORDER — VALACYCLOVIR HYDROCHLORIDE 1 G/1
1000 TABLET, FILM COATED ORAL 3 TIMES DAILY
Qty: 21 TABLET | Refills: 0 | Status: SHIPPED | OUTPATIENT
Start: 2022-04-25 | End: 2022-05-02

## 2022-04-25 RX ORDER — AMOXICILLIN AND CLAVULANATE POTASSIUM 875; 125 MG/1; MG/1
1 TABLET, FILM COATED ORAL 2 TIMES DAILY
Qty: 14 TABLET | Refills: 0 | Status: SHIPPED | OUTPATIENT
Start: 2022-04-25 | End: 2022-05-05

## 2022-04-25 RX ORDER — AMOXICILLIN AND CLAVULANATE POTASSIUM 875; 125 MG/1; MG/1
1 TABLET, FILM COATED ORAL ONCE
Status: COMPLETED | OUTPATIENT
Start: 2022-04-25 | End: 2022-04-25

## 2022-04-25 RX ADMIN — TETRACAINE HYDROCHLORIDE 2 DROP: 5 SOLUTION OPHTHALMIC at 14:17

## 2022-04-25 RX ADMIN — DOXYCYCLINE HYCLATE 100 MG: 100 CAPSULE ORAL at 14:47

## 2022-04-25 RX ADMIN — FLUORESCEIN SODIUM 1 MG: 1 STRIP OPHTHALMIC at 14:17

## 2022-04-25 RX ADMIN — AMOXICILLIN AND CLAVULANATE POTASSIUM 1 TABLET: 875; 125 TABLET, FILM COATED ORAL at 14:47

## 2022-04-25 RX ADMIN — HYDROCODONE BITARTRATE AND ACETAMINOPHEN 1 TABLET: 5; 325 TABLET ORAL at 14:47

## 2022-04-25 ASSESSMENT — PAIN SCALES - GENERAL: PAINLEVEL_OUTOF10: 9

## 2022-04-25 ASSESSMENT — PAIN - FUNCTIONAL ASSESSMENT: PAIN_FUNCTIONAL_ASSESSMENT: 0-10

## 2022-04-25 ASSESSMENT — PAIN DESCRIPTION - LOCATION: LOCATION: EYE

## 2022-04-25 ASSESSMENT — PAIN DESCRIPTION - PAIN TYPE: TYPE: ACUTE PAIN

## 2022-04-25 ASSESSMENT — PAIN DESCRIPTION - ORIENTATION: ORIENTATION: LEFT

## 2022-04-25 ASSESSMENT — PAIN DESCRIPTION - DESCRIPTORS: DESCRIPTORS: ACHING

## 2022-04-25 ASSESSMENT — VISUAL ACUITY: OD: 20/20

## 2022-04-25 NOTE — ED PROVIDER NOTES
[facility]  Department of Emergency Medicine   ED  Encounter Note  Admit Date/RoomTime: 2022  1:35 PM  ED Room: [place] [address]    NAME: Lonnie Raymond  : 2000  MRN: [id number]     Chief Complaint:  Eye Problem (Pt having Herpes simplex 1 break out on left eye, worsened over night)    History of Present Illness        Lonnie Raymond is a 24 y.o. old female presenting to the emergency department by private vehicle, for complaints of herpes simplex 1 outbreak of the left lateral eye with swelling of the left eyelid that worsened since last night. Patient reports she is having outbreaks since she has been 11years old her last outbreak was in 2021. She usually gets Valtrex. She denies any pain with eye movement. Her left eyelid is swollen shut she does not wear eye contacts or glasses. She does not have an eye doctor. She has never had herpes ophthalmicus. Patient denies any fevers, chills, hearing loss, headache, ear pain, blurred vision, double vision, nausea, vomiting or any other symptoms at this time. Circumstances:    []  Contact Lens Use     []  Recent URI Sx's     [x]  Spontaneous Onset     []  Close Contact w/similar Sx's     []  Work Related     History of:     []   Glaucoma     []   Recent Eye Surgery     ROS   Pertinent positives and negatives are stated within HPI, all other systems reviewed and are negative. Past Medical History:  has a past medical history of Asthma, Herpes, and Herpes simplex disease. Surgical History:  has no past surgical history on file. Social History:  reports that she has never smoked. She has never used smokeless tobacco. She reports that she does not drink alcohol and does not use drugs. Family History: family history includes Asthma in her brother, brother, father, sister, and sister; Cancer in her maternal grandmother; Drug Abuse in her maternal grandfather and maternal grandmother.      Allergies: Patient has no known allergies. Physical Exam   Oxygen Saturation Interpretation: Normal.        ED Triage Vitals   BP Temp Temp src Pulse Resp SpO2 Height Weight   04/25/22 1332 04/25/22 1321 -- 04/25/22 1321 04/25/22 1332 04/25/22 1321 04/25/22 1332 04/25/22 1332   105/67 97.9 °F (36.6 °C)  109 16 94 % 4' 11\" (1.499 m) 125 lb (56.7 kg)         Constitutional:  Alert, development consistent with age. HENT:  NC/NT. Airway patent. Neck:  Normal ROM. Supple. Eyes:         Pupils: equal, round, reactive to light and accommodation. Eyelids: Left upper Swelling/redness:  moderate swelling and erythema. Conjunctiva: Left no abnormal findings. Sclera: Left normal appearing. Cornea: Left no abnormalities were observed. EOM:  Intact Bilaterally. Fundoscopic:  grossly normal- discs sharp. Visual Acuity:  Within Normal Limit. OS 20/20; OD 20/20 OU 20/20 completed by Haley Mcnulty LPN  Integument:  No rashes, erythema present, unless noted elsewhere. Lymphatics: No lymphangitis or adenopathy noted. Neurological:  Oriented. Motor functions intact. Several vesicular lesions with clear serous fluid noted on the lateral aspect of upper eyelid with no eye involvement. No dendritic lesions no Villa sign. No ear pain. Hearing intact to fine finger rub. Skin over lesions very tender to light palpation. Lab / Imaging Results   (All laboratory and radiology results have been personally reviewed by myself)  Labs:  No results found for this visit on 04/25/22. Imaging: All Radiology results interpreted by Radiologist unless otherwise noted.   No orders to display       ED Course / Medical Decision Making     Medications   tetracaine (TETRAVISC) 0.5 % ophthalmic solution 2 drop (2 drops Left Eye Given 4/25/22 1417)   fluorescein ophthalmic strip 1 mg (1 mg Left Eye Given 4/25/22 1417)   amoxicillin-clavulanate (AUGMENTIN) 875-125 MG per tablet 1 tablet (1 tablet Oral Given 4/25/22 1447) doxycycline hyclate (VIBRAMYCIN) capsule 100 mg (100 mg Oral Given 4/25/22 1447)   HYDROcodone-acetaminophen (NORCO) 5-325 MG per tablet 1 tablet (1 tablet Oral Given 4/25/22 1447)            Consult(s):   None    Procedure(s):  WOOD's LAMP EXAM:  Performed By: BETHANY Choi CNP. Left Eye: Cornea: no abnormalities were observed, no abrasion or foreign bodies were noted, no corneal ulcer was observed, no limbal injection was noted and no pain with eye movement no proptosis no dendritic lesions. Flourescein stain: Negative. Anterior chamber: no abnormalities were observed, no cells, no hyphema and no flair. No Villa sign. MDM:   This is a 80-year-old female patient who arrives today who has a history of having recurrent HSV 1 and appears to have a preseptal cellulitis as well. She denies any recent piercings to the area and does have a small cluster of vesicular lesions with serous fluid collection and swelling to the upper eyelid. She has no pain with eye movement or proptosis. No Villa sign. No dendritic lesions. Visual acuity within normal limits. Patient is afebrile nontoxic in appearance and hemodynamically stable. She was medicated with a Percocet in the ED she did have a ride. Will give her Augmentin and doxycycline for concern for secondary preseptal cellulitis. She has no concern for pregnancy. She also will be started on Valtrex. She was given referral to ophthalmologist on-call for reevaluation. Patient was advised on specific red flag symptoms warranting immediate return to the ED for reevaluation at any time. Patient expressed understanding and states she has been dealing with this since she was 11years old and symptoms are similar to previous episodes and feels comfortable with discharge. Will give her short course of NSAIDs as well. Patient also advised for close follow-up with her PCP for reevaluation in the next 1 to 2 days.         Plan of Care/Counseling:  BETHANY Arias CNP reviewed today's visit with the patient in addition to providing specific details for the plan of care and counseling regarding the diagnosis and prognosis. Questions are answered at this time and are agreeable with the plan. Assessment      1. Preseptal cellulitis of left eye    2. Herpes zoster with complication      Plan   Discharged home. Patient condition is good    New Medications     Discharge Medication List as of 4/25/2022  2:50 PM      START taking these medications    Details   amoxicillin-clavulanate (AUGMENTIN) 875-125 MG per tablet Take 1 tablet by mouth 2 times daily for 10 days, Disp-14 tablet, R-0Normal      doxycycline hyclate (VIBRA-TABS) 100 MG tablet Take 1 tablet by mouth 2 times daily for 10 days, Disp-20 tablet, R-0Normal      ibuprofen (ADVIL;MOTRIN) 600 MG tablet Take 1 tablet by mouth every 6 hours as needed for Pain, Disp-20 tablet, R-0Normal           Electronically signed by BETHANY Arias CNP   DD: 4/25/22  **This report was transcribed using voice recognition software. Every effort was made to ensure accuracy; however, inadvertent computerized transcription errors may be present.   END OF ED PROVIDER NOTE      BETHANY Arias CNP  04/25/22 7659

## 2022-04-28 ENCOUNTER — E-VISIT (OUTPATIENT)
Dept: PRIMARY CARE CLINIC | Age: 22
End: 2022-04-28
Payer: COMMERCIAL

## 2022-04-28 DIAGNOSIS — N76.0 ACUTE VAGINITIS: Primary | ICD-10-CM

## 2022-04-28 PROCEDURE — 99422 OL DIG E/M SVC 11-20 MIN: CPT | Performed by: NURSE PRACTITIONER

## 2022-04-28 RX ORDER — FLUCONAZOLE 150 MG/1
150 TABLET ORAL ONCE
Qty: 1 TABLET | Refills: 1 | Status: SHIPPED | OUTPATIENT
Start: 2022-04-28 | End: 2022-04-28

## 2022-04-28 NOTE — PROGRESS NOTES
Reviewed questionnaire  Reviewed previous encounters, meds, allergies and history    Dx vaginitis    Plan  -rx for diflucan 150 mg po x 1. May repeat in 3 days if symptoms do not improve     I'm sorry to hear that you haven't been feeling well. I will sent in diflucan for what sounds like a yeast infection. If your symptoms worsen or fail to improve please see you PCP or OBGYN. Diflucan can be taken once or may be repeated in 3 days if your symptoms are not fully resolved. I have included information below to help with your symptoms at home. How you can care for yourself at home:     -Wear loose cotton clothing. Do not wear nylon or other fabric that holds body heat and moisture close to the skin. -Try sleeping without underwear.  -Do not scratch. Relieve itching with a cold pack or a cool bath. -Do not wash your vaginal area more than once a day. Use plain water or a mild, unscented soap. Air-dry the vaginal area. -Change out of wet swimsuits after swimming.  -Do not have sex until you have finished your treatment.  -Do not douche. Call your doctor if:      -You have unexpected vaginal bleeding.   -You have new or increased pain in your vagina or pelvis.   -You have a fever.   -You are not getting better after 3 days.   -Your symptoms come back after you finish your medicines. Hope you feel better soon.      Time spent 11-20

## 2022-04-28 NOTE — TELEPHONE ENCOUNTER
Started on antibiotics during ER visit and now has yeast infection.   Last Appointment:  10/29/2020  Future Appointments   Date Time Provider Elian Ceja   5/18/2022  2:20 PM MD Koki Bojorquez [facility]

## 2022-04-28 NOTE — PATIENT INSTRUCTIONS
Patient Education        Vaginitis: Care Instructions  Overview     Vaginitis is soreness or infection of the vagina. This common problem can cause itching and burning. And it can cause a change in vaginal discharge. Sometimes it can cause pain during sex. Vaginitis may be caused by bacteria, yeast, or other germs. Some infections that cause it are caught from a sexual partner. Bath products, spermicides, and douches can irritate the vagina too. This problem can also happen during and after menopause. A drop in estrogenlevels during this time can cause dryness, soreness, and pain during sex. Your doctor can give you medicine to treat vaginitis. And home care may help you feel better. For certain types of infections, your sex partner(s) must betreated too. Follow-up care is a key part of your treatment and safety. Be sure to make and go to all appointments, and call your doctor if you are having problems. It's also a good idea to know your test results and keep alist of the medicines you take. How can you care for yourself at home?  Take your medicines exactly as prescribed. Call your doctor if you think you are having a problem with your medicine.  Ask your doctor if your sex partner(s) also needs treatment.  Do not eat or drink anything that has alcohol if you are taking metronidazole (Flagyl).  Wash your vulva daily with water. You also can use a mild, unscented soap if you want.  Do not use scented bath products. And do not use vaginal sprays or douches.  Change out of wet or damp clothes as soon as possible. Wear cotton underwear. And avoid tight clothing that could increase moisture.  Put a washcloth soaked in cool water on the area to relieve itching. Or you can take cool baths.  If you have dryness because of menopause, use estrogen cream or pills that your doctor prescribes.  Ask your doctor about when it is okay to have sex.  Use a personal lubricant before sex if you have dryness. Examples are Astroglide, K-Y Jelly, and Wet Lubricant Gel. When should you call for help? Call your doctor now or seek immediate medical care if:     You have a fever.      You have new or increased pain in your vagina or pelvis.      You have new or worse vaginal itching or discharge. Watch closely for changes in your health, and be sure to contact your doctor if:     You have bleeding other than your period.      You do not get better as expected. Where can you learn more? Go to [email or web address]://[email or web address] org and sign in to your [employer] account. Enter C976 in the SYLOB box to learn more about \"Vaginitis: Care Instructions. \"     If you do not have an account, please click on the \"Sign Up Now\" link. Current as of: November 22, 2021               Content Version: 13.2  © 7009-2740 [employer]wise, Incorporated. Care instructions adapted under license by [facility] ([facility]). If you have questions about a medical condition or this instruction, always ask your healthcare professional. [address] any warranty or liability for your use of this information.

## 2022-04-29 RX ORDER — FLUCONAZOLE 150 MG/1
150 TABLET ORAL ONCE
Qty: 1 TABLET | Refills: 0 | Status: SHIPPED | OUTPATIENT
Start: 2022-04-29 | End: 2022-04-29

## 2022-10-13 ENCOUNTER — HOSPITAL ENCOUNTER (EMERGENCY)
Age: 22
Discharge: HOME | End: 2022-10-13
Payer: COMMERCIAL

## 2022-10-13 VITALS
SYSTOLIC BLOOD PRESSURE: 116 MMHG | OXYGEN SATURATION: 97 % | HEART RATE: 88 BPM | WEIGHT: 119.5 LBS | RESPIRATION RATE: 16 BRPM | TEMPERATURE: 98 F | BODY MASS INDEX: 24.14 KG/M2 | DIASTOLIC BLOOD PRESSURE: 67 MMHG

## 2022-10-13 DIAGNOSIS — R31.29 OTHER MICROSCOPIC HEMATURIA: ICD-10-CM

## 2022-10-13 DIAGNOSIS — N30.90 CYSTITIS: Primary | ICD-10-CM

## 2022-10-13 LAB
BACTERIA: ABNORMAL /HPF
BILIRUBIN URINE: NEGATIVE
BLOOD, URINE: ABNORMAL
CLARITY: ABNORMAL
COLOR: YELLOW
GLUCOSE URINE: NEGATIVE MG/DL
HCG, URINE, POC: NEGATIVE
KETONES, URINE: NEGATIVE MG/DL
LEUKOCYTE ESTERASE, URINE: ABNORMAL
Lab: NORMAL
NEGATIVE QC PASS/FAIL: NORMAL
NITRITE, URINE: NEGATIVE
PH UA: 6 (ref 5–9)
POSITIVE QC PASS/FAIL: NORMAL
PROTEIN UA: NEGATIVE MG/DL
RBC UA: ABNORMAL /HPF (ref 0–2)
SPECIFIC GRAVITY UA: <=1.005 (ref 1–1.03)
UROBILINOGEN, URINE: 0.2 E.U./DL
WBC UA: >20 /HPF (ref 0–5)

## 2022-10-13 PROCEDURE — 87088 URINE BACTERIA CULTURE: CPT

## 2022-10-13 PROCEDURE — 87186 SC STD MICRODIL/AGAR DIL: CPT

## 2022-10-13 PROCEDURE — 81001 URINALYSIS AUTO W/SCOPE: CPT

## 2022-10-13 PROCEDURE — 99283 EMERGENCY DEPT VISIT LOW MDM: CPT

## 2022-10-13 RX ORDER — NITROFURANTOIN 25; 75 MG/1; MG/1
100 CAPSULE ORAL 2 TIMES DAILY
Qty: 10 CAPSULE | Refills: 0 | Status: SHIPPED | OUTPATIENT
Start: 2022-10-13 | End: 2022-10-18

## 2022-10-13 RX ORDER — PHENAZOPYRIDINE HYDROCHLORIDE 100 MG/1
200 TABLET, FILM COATED ORAL 3 TIMES DAILY PRN
Qty: 6 TABLET | Refills: 0 | Status: SHIPPED | OUTPATIENT
Start: 2022-10-13 | End: 2022-10-15

## 2022-10-13 NOTE — ED PROVIDER NOTES
[address]  Department of Emergency Medicine   ED  Encounter Note  Admit Date/RoomTime: 10/13/2022  2:19 PM  ED Room: [place] [address]/    NAME: Jorge Camacho  : 2000  MRN: [id number]     Chief Complaint:  Dysuria (Starting yesterday along with pain in bladder; denies N/V/D/fevers/chills)    History of Present Illness       Jorge Camacho is a 24 y.o. old female who presents to the emergency department by private vehicle, for dysuria and hematuria, which occured 1 day(s) prior to arrival.  Since onset the symptoms have been stable, unchanged, and remaining constant and moderate in severity. Patient states \"it burns when I pee and my whole bladder hurts\". Patient localizes her pain by pointing at the suprapubic region. Patient reports that pain radiates into abdomen. Symptoms are associated with abdominal discomfort. Jorge Camacho has no prior or recurrent UTI's. Patient's last menstrual period was 10/02/2022. Patient reports that she is not sexually active and is not on birth control. Patient denies concerns for pregnancy or STI. Patient reports drinking lots of pop and tea, she also reports minimal water intake. Patient denies nausea, vomiting, fever, chills, vaginal discharge. OB History    No obstetric history on file. Pink Al GYN history non-contributory or N/A.    ROS   Pertinent positives and negatives are stated within HPI, all other systems reviewed and are negative. Past Medical History:  has a past medical history of Asthma, Herpes, and Herpes simplex disease. Surgical History:  has no past surgical history on file. Social History:  reports that she has never smoked. She has never used smokeless tobacco. She reports that she does not drink alcohol and does not use drugs.     Family History: family history includes Asthma in her brother, brother, father, sister, and sister; Cancer in her maternal grandmother; Drug Abuse in her maternal grandfather and maternal grandmother. Allergies: Patient has no known allergies. Physical Exam   Oxygen Saturation Interpretation: Normal.        ED Triage Vitals   BP Temp Temp src Heart Rate Resp SpO2 Height Weight   10/13/22 1407 10/13/22 1339 -- 10/13/22 1339 10/13/22 1404 10/13/22 1339 -- 10/13/22 1407   116/67 98 °F (36.7 °C)  88 16 97 %  119 lb 8 oz (54.2 kg)         Constitutional:  Alert, development consistent with age. NAD. HEENT:  NC/NT. Airway patent. Neck:  Normal ROM. Supple. Respiratory:  Lungs Clear to auscultation and breath sounds equal.  CV:  Regular rate and rhythm, normal heart sounds, without pathological murmurs, ectopy, gallops, or rubs. GI:  normal appearing, non-distended with no visible hernias. Nonsurgical abdomen. Bowel sounds: normal bowel sounds in all four quadrants. Tenderness: There is moderate tenderness to light palpation present located diffusely in the entire abdomen. Liver: non-tender and non-palpable. Spleen:  non-tender and non-palpable. : /Pelvic examination deferred / declined by patient. Back: CVA Tenderness: No CVA tenderness. Integument:  Normal turgor. Warm, dry, without visible rash, unless noted elsewhere. Lymphatics: No lymphangitis or adenopathy noted. Neurological:  Oriented. Motor functions intact.     Lab / Imaging Results   (All laboratory and radiology results have been personally reviewed by myself)  Labs:  Results for orders placed or performed during the hospital encounter of 10/13/22   Urinalysis with Microscopic   Result Value Ref Range    Color, UA Yellow Straw/Yellow    Clarity, UA SL CLOUDY Clear    Glucose, Ur Negative Negative mg/dL    Bilirubin Urine Negative Negative    Ketones, Urine Negative Negative mg/dL    Specific Gravity, UA <=1.005 1.005 - 1.030    Blood, Urine LARGE (A) Negative    pH, UA 6.0 5.0 - 9.0    Protein, UA Negative Negative mg/dL    Urobilinogen, Urine 0.2 <2.0 E.U./dL Nitrite, Urine Negative Negative    Leukocyte Esterase, Urine LARGE (A) Negative    WBC, UA >20 (A) 0 - 5 /HPF    RBC, UA 2-5 0 - 2 /HPF    Bacteria, UA MODERATE (A) None Seen /HPF   POC Pregnancy Urine Qual   Result Value Ref Range    HCG, Urine, POC Negative Negative    Lot Number [id number]     Positive QC Pass/Fail Acceptable     Negative QC Pass/Fail Acceptable        Imaging: All Radiology results interpreted by Radiologist unless otherwise noted. No orders to display     ED Course / Medical Decision Making   Medications - No data to display     Re-examination:  10/13/22       Time: 1425   Patients symptoms show no change. Consults:   None    Procedures:   none    Medical Decision Making:  Patient is a 24 y.o. female presenting with dysuria. Patient reports hematuria and diffuse suprapubic pain which radiates to the abdomen. Physical exam was unremarkable aside from diffuse abdominal tenderness to light palpation. No CVA tenderness. Urinalysis showed leukocytes and blood but no nitrates. Due to patient having no nitrates she cannot be enrolled in the study. Due to patient being symptomatic she will be treated with Macrobid twice a day for 5 days. Patient also given Pyridium for her discomfort. Patient was discharged home and told to follow up with primary care. Patient educated on the importance of following up with PCP to ensure eradication of infection. Patient advised to drink plenty of water and stay hydrated. Patient told to return to ED with any worsening of symptoms, severe abdominal pain/cramping, vaginal bleeding, fever or chills. Patient was discharged home. Patient is nontoxic in appearance, vitally stable, and ready for outpatient follow up. Plan of Care/Counseling:  Keila Garcia PA-C reviewed today's visit with the patient in addition to providing specific details for the plan of care and counseling regarding the diagnosis and prognosis.   Questions are answered at this time and are agreeable with the plan. Assessment      1. Cystitis    2. Other microscopic hematuria      Plan   Disposition:   Discharged home. Patient condition is stable    New Medications     New Prescriptions    NITROFURANTOIN, MACROCRYSTAL-MONOHYDRATE, (MACROBID) 100 MG CAPSULE    Take 1 capsule by mouth 2 times daily for 5 days    PHENAZOPYRIDINE (PYRIDIUM) 100 MG TABLET    Take 2 tablets by mouth 3 times daily as needed for Pain     Electronically signed by Henry Escudero PA-C   DD: 10/13/22  **This report was transcribed using voice recognition software. Every effort was made to ensure accuracy; however, inadvertent computerized transcription errors may be present.   END OF ED PROVIDER NOTE      Henry Escudero PA-C  10/13/22 2957

## 2022-10-13 NOTE — DISCHARGE INSTRUCTIONS
Please take your antibiotics and full. Please use the Pyridium for your discomfort you can take it up to 3 times a day but no that will cause her urine to change colors. You must drink 7-10 bottles of water a day while on this medication to help flush your system.   Please follow-up with the family doctor listed above

## 2022-10-13 NOTE — Clinical Note
Ovi Patricia was seen and treated in our emergency department on 10/13/2022. She may return to work on 10/15/2022. If you have any questions or concerns, please don't hesitate to call.       Natalie Arreola PA-C

## 2022-10-15 LAB
ORGANISM: ABNORMAL
URINE CULTURE, ROUTINE: ABNORMAL

## 2023-08-28 ENCOUNTER — NURSE TRIAGE (OUTPATIENT)
Dept: OTHER | Facility: CLINIC | Age: 23
End: 2023-08-28

## 2023-08-28 NOTE — TELEPHONE ENCOUNTER
Location of patient: [facility]  Received call from  at [facility] ; Patient with [employer] requesting to establish care with . Subjective: Caller states  3rd day of symptoms      Current Symptoms:   Runny nose  Tonsils hurting  Severe Coughing  Yellow mucous from nose  HX of Asthma   Fatigue        Denies:  Chest pain from coughing  Fever  Loss of taste or smell  Has not taken COVID test        Onset: 3 days ago;       Pain Severity: 3/10; Temperature:  denies Fever    What has been tried: dayquil, tylenol, nyquil      Recommended disposition: [facility] advice provided, patient verbalizes understanding; denies any other questions or concerns; instructed to call back for any new or worsening symptoms. Pt agrees to Home care. Warm transferred to [facility] ([facility]) to establish new pt. Attention Provider: Thank you for allowing me to participate in the care of your patient. The patient was connected to triage in response to information provided to the [facility]. Please do not respond through this encounter as the response is not directed to a shared pool.         Reason for Disposition   Common cold with no complications    Protocols used: Common Cold-ADULT-

## 2023-11-06 ENCOUNTER — APPOINTMENT (OUTPATIENT)
Dept: GENERAL RADIOLOGY | Age: 23
End: 2023-11-06
Payer: COMMERCIAL

## 2023-11-06 ENCOUNTER — HOSPITAL ENCOUNTER (EMERGENCY)
Age: 23
Discharge: HOME | End: 2023-11-06
Payer: COMMERCIAL

## 2023-11-06 VITALS
SYSTOLIC BLOOD PRESSURE: 107 MMHG | TEMPERATURE: 97.4 F | OXYGEN SATURATION: 100 % | RESPIRATION RATE: 14 BRPM | DIASTOLIC BLOOD PRESSURE: 73 MMHG | HEART RATE: 89 BPM

## 2023-11-06 DIAGNOSIS — S93.401A SPRAIN OF RIGHT ANKLE, UNSPECIFIED LIGAMENT, INITIAL ENCOUNTER: Primary | ICD-10-CM
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
## 2023-11-06 PROCEDURE — 6370000000 HC RX 637 (ALT 250 FOR IP): Performed by: NURSE PRACTITIONER

## 2023-11-06 PROCEDURE — 73630 X-RAY EXAM OF FOOT: CPT

## 2023-11-06 PROCEDURE — 99283 EMERGENCY DEPT VISIT LOW MDM: CPT

## 2023-11-06 PROCEDURE — 73610 X-RAY EXAM OF ANKLE: CPT

## 2023-11-06 RX ORDER — NAPROXEN 500 MG/1
500 TABLET ORAL 2 TIMES DAILY PRN
Qty: 14 TABLET | Refills: 0 | Status: SHIPPED | OUTPATIENT
Start: 2023-11-06 | End: 2023-11-13

## 2023-11-06 RX ORDER — ACETAMINOPHEN 500 MG
500 TABLET ORAL 4 TIMES DAILY PRN
Qty: 20 TABLET | Refills: 1 | Status: SHIPPED | OUTPATIENT
Start: 2023-11-06

## 2023-11-06 RX ORDER — HYDROCODONE BITARTRATE AND ACETAMINOPHEN 5; 325 MG/1; MG/1
1 TABLET ORAL ONCE
Status: COMPLETED | OUTPATIENT
Start: 2023-11-06 | End: 2023-11-06

## 2023-11-06 RX ADMIN — HYDROCODONE BITARTRATE AND ACETAMINOPHEN 1 TABLET: 5; 325 TABLET ORAL at 15:54

## 2023-11-06 ASSESSMENT — PAIN SCALES - GENERAL: PAINLEVEL_OUTOF10: 8

## 2023-11-06 ASSESSMENT — PAIN DESCRIPTION - ORIENTATION: ORIENTATION: RIGHT

## 2023-11-06 ASSESSMENT — PAIN DESCRIPTION - LOCATION: LOCATION: ANKLE

## 2024-01-05 ENCOUNTER — E-VISIT (OUTPATIENT)
Dept: PRIMARY CARE CLINIC | Age: 24
End: 2024-01-05

## 2024-01-05 DIAGNOSIS — N76.0 ACUTE VAGINITIS: Primary | ICD-10-CM

## 2024-01-06 RX ORDER — FLUCONAZOLE 150 MG/1
150 TABLET ORAL ONCE
Qty: 1 TABLET | Refills: 1 | Status: SHIPPED | OUTPATIENT
Start: 2024-01-06 | End: 2024-01-06

## 2024-01-06 NOTE — PROGRESS NOTES
Renee Ugarte (2000) initiated an asynchronous digital communication through [employer].    HPI: per patient questionnaire     Exam: not applicable    Diagnoses and all orders for this visit:  Diagnoses and all orders for this visit:    Acute vaginitis    Other orders  -     fluconazole (DIFLUCAN) 150 MG tablet; Take 1 tablet by mouth once for 1 dose May repeat in 3 days if needed      Sent photos. Denies itching. More irritation. Recommend contacting gyn for further eval and discussion     Time: EV3 - 21 or more minutes were spent on the digital evaluation and management of this patient.25 min     Cherelle Shirley, APRN - CNP

## 2024-01-10 ENCOUNTER — E-VISIT (OUTPATIENT)
Dept: PRIMARY CARE CLINIC | Age: 24
End: 2024-01-10
Payer: COMMERCIAL

## 2024-01-10 DIAGNOSIS — N76.0 ACUTE VAGINITIS: Primary | ICD-10-CM

## 2024-01-10 PROCEDURE — 99422 OL DIG E/M SVC 11-20 MIN: CPT | Performed by: NURSE PRACTITIONER

## 2024-01-10 NOTE — PROGRESS NOTES
Renee Ugarte (2000) initiated an asynchronous digital communication through [employer].    HPI: per patient questionnaire     Exam: not applicable    Diagnoses and all orders for this visit:  Diagnoses and all orders for this visit:    Acute vaginitis      Treated on 1/5/24 via e visit with diflucan. If symptoms not improving, recommend being seen in person.     Time: EV2 - 11-20 minutes were spent on the digital evaluation and management of this patient. 18 min     Cherelle Shirley, BETHANY - CNP

## 2024-02-13 ENCOUNTER — HOSPITAL ENCOUNTER (EMERGENCY)
Age: 24
Discharge: HOME | End: 2024-02-13
Payer: COMMERCIAL

## 2024-02-13 VITALS
WEIGHT: 150 LBS | HEIGHT: 59 IN | SYSTOLIC BLOOD PRESSURE: 121 MMHG | OXYGEN SATURATION: 100 % | HEART RATE: 76 BPM | BODY MASS INDEX: 30.24 KG/M2 | TEMPERATURE: 97.6 F | RESPIRATION RATE: 16 BRPM | DIASTOLIC BLOOD PRESSURE: 53 MMHG

## 2024-02-13 DIAGNOSIS — K02.9 DENTAL DECAY: ICD-10-CM

## 2024-02-13 DIAGNOSIS — K02.9 PAIN DUE TO DENTAL CARIES: Primary | ICD-10-CM

## 2024-02-13 PROCEDURE — 6360000002 HC RX W HCPCS

## 2024-02-13 PROCEDURE — 99284 EMERGENCY DEPT VISIT MOD MDM: CPT

## 2024-02-13 PROCEDURE — 6370000000 HC RX 637 (ALT 250 FOR IP)

## 2024-02-13 PROCEDURE — 96372 THER/PROPH/DIAG INJ SC/IM: CPT

## 2024-02-13 RX ORDER — AMOXICILLIN AND CLAVULANATE POTASSIUM 875; 125 MG/1; MG/1
1 TABLET, FILM COATED ORAL ONCE
Status: COMPLETED | OUTPATIENT
Start: 2024-02-13 | End: 2024-02-13

## 2024-02-13 RX ORDER — AMOXICILLIN AND CLAVULANATE POTASSIUM 875; 125 MG/1; MG/1
1 TABLET, FILM COATED ORAL 2 TIMES DAILY
Qty: 14 TABLET | Refills: 0 | Status: SHIPPED | OUTPATIENT
Start: 2024-02-13 | End: 2024-02-20

## 2024-02-13 RX ORDER — CHLORHEXIDINE GLUCONATE ORAL RINSE 1.2 MG/ML
15 SOLUTION DENTAL 2 TIMES DAILY
Qty: 420 ML | Refills: 0 | Status: SHIPPED | OUTPATIENT
Start: 2024-02-13 | End: 2024-02-27

## 2024-02-13 RX ORDER — IBUPROFEN 600 MG/1
600 TABLET ORAL 3 TIMES DAILY PRN
Qty: 30 TABLET | Refills: 0 | Status: SHIPPED | OUTPATIENT
Start: 2024-02-13

## 2024-02-13 RX ORDER — KETOROLAC TROMETHAMINE 30 MG/ML
30 INJECTION, SOLUTION INTRAMUSCULAR; INTRAVENOUS ONCE
Status: COMPLETED | OUTPATIENT
Start: 2024-02-13 | End: 2024-02-13

## 2024-02-13 RX ORDER — ACETAMINOPHEN 500 MG
500 TABLET ORAL 4 TIMES DAILY PRN
Qty: 120 TABLET | Refills: 0 | Status: SHIPPED | OUTPATIENT
Start: 2024-02-13

## 2024-02-13 RX ADMIN — KETOROLAC TROMETHAMINE 30 MG: 30 INJECTION, SOLUTION INTRAMUSCULAR; INTRAVENOUS at 02:56

## 2024-02-13 RX ADMIN — AMOXICILLIN AND CLAVULANATE POTASSIUM 1 TABLET: 875; 125 TABLET, FILM COATED ORAL at 02:53

## 2024-02-16 ENCOUNTER — E-VISIT (OUTPATIENT)
Dept: PRIMARY CARE CLINIC | Age: 24
End: 2024-02-16

## 2024-02-16 DIAGNOSIS — N76.0 ACUTE VAGINITIS: Primary | ICD-10-CM

## 2024-02-17 RX ORDER — FLUCONAZOLE 150 MG/1
150 TABLET ORAL ONCE
Qty: 1 TABLET | Refills: 1 | Status: SHIPPED | OUTPATIENT
Start: 2024-02-17 | End: 2024-02-17

## 2024-02-17 NOTE — PROGRESS NOTES
Renee Ugarte (2000) initiated an asynchronous digital communication through [email or web address].    HPI: per patient questionnaire     Exam: not applicable    Diagnoses and all orders for this visit:  Diagnoses and all orders for this visit:    Acute vaginitis    Other orders  -     fluconazole (DIFLUCAN) 150 MG tablet; Take 1 tablet by mouth once for 1 dose May repeat in 3 days if needed      2/13/24 seen in ER for dental pain and started on antibiotics. Now c/o vaginitis. Will send diflucan supportive care measures and fu with pcp or gyn   Time: EV3 - 21 or more minutes were spent on the digital evaluation and management of this patient.22 min     Cherelle Shirley, APRN - CNP

## 2024-02-18 ENCOUNTER — E-VISIT (OUTPATIENT)
Dept: PRIMARY CARE CLINIC | Age: 24
End: 2024-02-18
Payer: COMMERCIAL

## 2024-02-18 DIAGNOSIS — N76.0 ACUTE VAGINITIS: Primary | ICD-10-CM

## 2024-02-18 PROCEDURE — 99422 OL DIG E/M SVC 11-20 MIN: CPT | Performed by: NURSE PRACTITIONER

## 2024-02-18 NOTE — PROGRESS NOTES
Renee Ugarte (2000) initiated an asynchronous digital communication through [employer].    HPI: per patient questionnaire     Exam: not applicable    Diagnoses and all orders for this visit:  Diagnoses and all orders for this visit:    Acute vaginitis      C/o yeast infection symptoms still. Submitted an e visit a few days ago. Refill on diflucan. Pt informed     Time: EV2 - 11-20 minutes were spent on the digital evaluation and management of this patient. 15 min     BETHANY Olvera - CNP

## 2024-02-22 ENCOUNTER — E-VISIT (OUTPATIENT)
Dept: PRIMARY CARE CLINIC | Age: 24
End: 2024-02-22
Payer: COMMERCIAL

## 2024-02-22 DIAGNOSIS — N76.0 ACUTE VAGINITIS: Primary | ICD-10-CM

## 2024-02-22 PROCEDURE — 99422 OL DIG E/M SVC 11-20 MIN: CPT | Performed by: NURSE PRACTITIONER

## 2024-02-22 NOTE — PROGRESS NOTES
Renee Ugarte (2000) initiated an asynchronous digital communication through [employer].    HPI: per patient questionnaire     Exam: not applicable    Diagnoses and all orders for this visit:  Diagnoses and all orders for this visit:    Acute vaginitis    Pt was instructed to call pharmacy to have refill on diflucan. She states her understanding     Time: EV2 - 11-20 minutes were spent on the digital evaluation and management of this patient. 16 min    Cherelle Shirley, BETHANY - CNP

## 2024-02-27 ENCOUNTER — E-VISIT (OUTPATIENT)
Dept: PRIMARY CARE CLINIC | Age: 24
End: 2024-02-27
Payer: COMMERCIAL

## 2024-02-27 DIAGNOSIS — N89.8 VAGINAL DISCHARGE: Primary | ICD-10-CM

## 2024-02-27 PROCEDURE — 99422 OL DIG E/M SVC 11-20 MIN: CPT | Performed by: NURSE PRACTITIONER

## 2024-02-27 RX ORDER — FLUCONAZOLE 150 MG/1
150 TABLET ORAL ONCE
Qty: 1 TABLET | Refills: 0 | Status: SHIPPED | OUTPATIENT
Start: 2024-02-27 | End: 2024-02-27

## 2024-02-27 NOTE — PROGRESS NOTES
Reviewed questionnaire    Reviewed meds/allergies    Dx Vaginal discharge    Plan Rx given for diflucan, follow up with PCP if no improvement    Time spent on visit 11 min

## 2024-03-07 ENCOUNTER — E-VISIT (OUTPATIENT)
Dept: PRIMARY CARE CLINIC | Age: 24
End: 2024-03-07
Payer: COMMERCIAL

## 2024-03-07 DIAGNOSIS — R30.0 DYSURIA: Primary | ICD-10-CM

## 2024-03-07 PROCEDURE — 99423 OL DIG E/M SVC 21+ MIN: CPT | Performed by: NURSE PRACTITIONER

## 2024-03-07 RX ORDER — NITROFURANTOIN 25; 75 MG/1; MG/1
100 CAPSULE ORAL 2 TIMES DAILY
Qty: 10 CAPSULE | Refills: 0 | Status: SHIPPED | OUTPATIENT
Start: 2024-03-07 | End: 2024-03-12

## 2024-03-07 NOTE — PROGRESS NOTES
Renee Ugarte (2000) initiated an asynchronous digital communication through [employer].    HPI: per patient questionnaire     Exam: not applicable    Diagnoses and all orders for this visit:  Diagnoses and all orders for this visit:    Dysuria    Other orders  -     nitrofurantoin, macrocrystal-monohydrate, (MACROBID) 100 MG capsule; Take 1 capsule by mouth 2 times daily for 5 days    Patient has submitted multiple ED visits in the last month for vaginal related concerns.  This is 1 for dysuria.  Antibiotic sent.  She was encouraged to make an appointment with her PCP and notified that she will need to be seen in person for further concerns    Recent e visits  1/5/24  1/10/24  2/13/24 ER  2/16/24 2/18/24 2/22/24 2/2/24  Time: EV3 - 21 or more minutes were spent on the digital evaluation and management of this patient.22 min    BETHANY Olvera - CNP

## 2024-03-21 ENCOUNTER — OFFICE VISIT (OUTPATIENT)
Dept: FAMILY MEDICINE CLINIC | Age: 24
End: 2024-03-21
Payer: COMMERCIAL

## 2024-03-21 VITALS
DIASTOLIC BLOOD PRESSURE: 62 MMHG | RESPIRATION RATE: 16 BRPM | WEIGHT: 146 LBS | BODY MASS INDEX: 24.92 KG/M2 | TEMPERATURE: 98.1 F | SYSTOLIC BLOOD PRESSURE: 105 MMHG | HEIGHT: 64 IN

## 2024-03-21 DIAGNOSIS — Z12.4 PAP SMEAR FOR CERVICAL CANCER SCREENING: ICD-10-CM

## 2024-03-21 DIAGNOSIS — R30.0 DYSURIA: Primary | ICD-10-CM

## 2024-03-21 DIAGNOSIS — F41.9 ANXIETY: ICD-10-CM

## 2024-03-21 DIAGNOSIS — Z71.6 ENCOUNTER FOR SMOKING CESSATION COUNSELING: ICD-10-CM

## 2024-03-21 PROCEDURE — G8427 DOCREV CUR MEDS BY ELIG CLIN: HCPCS

## 2024-03-21 PROCEDURE — G8484 FLU IMMUNIZE NO ADMIN: HCPCS

## 2024-03-21 PROCEDURE — G8417 CALC BMI ABV UP PARAM F/U: HCPCS

## 2024-03-21 PROCEDURE — 1036F TOBACCO NON-USER: CPT

## 2024-03-21 PROCEDURE — 99213 OFFICE O/P EST LOW 20 MIN: CPT

## 2024-03-21 RX ORDER — FLUCONAZOLE 150 MG/1
150 TABLET ORAL ONCE
Qty: 1 TABLET | Refills: 0 | Status: SHIPPED | OUTPATIENT
Start: 2024-03-21 | End: 2024-03-21

## 2024-03-21 SDOH — ECONOMIC STABILITY: FOOD INSECURITY: WITHIN THE PAST 12 MONTHS, THE FOOD YOU BOUGHT JUST DIDN'T LAST AND YOU DIDN'T HAVE MONEY TO GET MORE.: NEVER TRUE

## 2024-03-21 SDOH — ECONOMIC STABILITY: HOUSING INSECURITY
IN THE LAST 12 MONTHS, WAS THERE A TIME WHEN YOU DID NOT HAVE A STEADY PLACE TO SLEEP OR SLEPT IN A SHELTER (INCLUDING NOW)?: NO

## 2024-03-21 SDOH — ECONOMIC STABILITY: INCOME INSECURITY: HOW HARD IS IT FOR YOU TO PAY FOR THE VERY BASICS LIKE FOOD, HOUSING, MEDICAL CARE, AND HEATING?: NOT VERY HARD

## 2024-03-21 SDOH — ECONOMIC STABILITY: FOOD INSECURITY: WITHIN THE PAST 12 MONTHS, YOU WORRIED THAT YOUR FOOD WOULD RUN OUT BEFORE YOU GOT MONEY TO BUY MORE.: NEVER TRUE

## 2024-03-21 ASSESSMENT — PATIENT HEALTH QUESTIONNAIRE - PHQ9
SUM OF ALL RESPONSES TO PHQ QUESTIONS 1-9: 0
SUM OF ALL RESPONSES TO PHQ QUESTIONS 1-9: 0
2. FEELING DOWN, DEPRESSED OR HOPELESS: NOT AT ALL
1. LITTLE INTEREST OR PLEASURE IN DOING THINGS: NOT AT ALL
SUM OF ALL RESPONSES TO PHQ9 QUESTIONS 1 & 2: 0
SUM OF ALL RESPONSES TO PHQ QUESTIONS 1-9: 0
SUM OF ALL RESPONSES TO PHQ QUESTIONS 1-9: 0

## 2024-03-21 ASSESSMENT — ANXIETY QUESTIONNAIRES
6. BECOMING EASILY ANNOYED OR IRRITABLE: NEARLY EVERY DAY
5. BEING SO RESTLESS THAT IT IS HARD TO SIT STILL: NOT AT ALL
2. NOT BEING ABLE TO STOP OR CONTROL WORRYING: SEVERAL DAYS
7. FEELING AFRAID AS IF SOMETHING AWFUL MIGHT HAPPEN: SEVERAL DAYS
GAD7 TOTAL SCORE: 10
1. FEELING NERVOUS, ANXIOUS, OR ON EDGE: MORE THAN HALF THE DAYS
4. TROUBLE RELAXING: SEVERAL DAYS
3. WORRYING TOO MUCH ABOUT DIFFERENT THINGS: MORE THAN HALF THE DAYS
IF YOU CHECKED OFF ANY PROBLEMS ON THIS QUESTIONNAIRE, HOW DIFFICULT HAVE THESE PROBLEMS MADE IT FOR YOU TO DO YOUR WORK, TAKE CARE OF THINGS AT HOME, OR GET ALONG WITH OTHER PEOPLE: NOT DIFFICULT AT ALL

## 2024-03-21 NOTE — PROGRESS NOTES
S: Renee Ugarte 23 y.o. female  here for New to Establish care; complains of persistent UTI symptoms and anxiety      PMH: Mild intermittent Asthma with very rare use of rescue inhaler    Urinary Symptoms:  Had E-visit 3/7/24 for symptoms of itching, burling, white discharge and suprapubic pain.  During this visit : Macrobid and Diflucan.  Since then: did not complete ATB due to yeast infestion concerns.  Al symptoms but dysuria and mild suprapubic pain have resolved; these symptoms have improved but are persistent. No associated symptoms.    Anxiety:  EZEQUIEL 11. Denies SI. Onset of symptoms x 2 years; worse with passing of family members. Get symptoms relief with smoking tobacco--1/3 ppd. She is in contemplative phase of cessation and inquired about use of nicotine patches.  Declines medications or CBT, but she will consider for next visit    Health Maintenance:  Renee Ugarte is due for Pap smear, which is completed today.     O: VS: /62   Temp 98.1 °F (36.7 °C) (Temporal)   Resp 16   Ht 1.626 m (5' 4\")   Wt 66.2 kg (146 lb)   LMP 03/20/2024 (Approximate)   BMI 25.06 kg/m²    General: NAD              HEENT: WDL              Neck: WDL   CV:  RRR, no gallops, rubs, or murmurs   Resp: CTAB no R/R/W   Abd:  Soft, mild suprapubic tenderness to palpation no masses    Ext:  no C/C/E             : unremarkable.  Pap and cultures obtained    Assessment / Plan:         1. Dysuria  Sxs indicative of yeast infection  [facility] to evaluate for possible pathogens including yeast, BV, and STIs  - fluconazole (DIFLUCAN) 150 MG tablet; Take 1 tablet by mouth once for 1 dose  Dispense: 1 tablet; Refill: 0  - MISCELLANEOUS SENDOUT ID Molecular Testing - [facility]; Future     2. Anxiety  EZEQUIEL-7 indicates moderate anxiety   Patient declines medication/counseling at this time but states she will consider it and re-address at f/u appt     3. Pap smear for cervical cancer screening  Patient due for cervical cancer 
   acetaminophen (TYLENOL) 500 MG tablet Take 1 tablet by mouth 4 times daily as needed for Pain (Patient not taking: Reported on 3/21/2024) 120 tablet 0    ibuprofen (ADVIL;MOTRIN) 600 MG tablet Take 1 tablet by mouth 3 times daily as needed for Pain (Patient not taking: Reported on 3/21/2024) 30 tablet 0     No current facility-administered medications for this visit.      ______________________________________________________________________    Physical Exam:    Blood pressure 105/62, temperature 98.1 °F (36.7 °C), temperature source Temporal, resp. rate 16, height 1.626 m (5' 4\"), weight 66.2 kg (146 lb), last menstrual period 03/20/2024, not currently breastfeeding.     General: No acute distress, alert and interacting appropriately  Cardiovascular: regular rate and rhythm, no murmurs/gallops/rubs  Pulmonary: lungs clear to auscultation bilaterally  Abdomen: nondistended, Mild suprapubic tenderness to deep palpation, bowel sounds active  Extremities: no peripheral edema  : external genitalia WNL, cervix visualized, no lesions noted, no vaginal bleeding noted, Thick, white discharge noted in vaginal vault       ______________________________________________________________________    Assessment & Plan:  1. Dysuria  Sxs indicative of yeast infection  [facility] to evaluate for possible pathogens including yeast, BV, and STIs  - fluconazole (DIFLUCAN) 150 MG tablet; Take 1 tablet by mouth once for 1 dose  Dispense: 1 tablet; Refill: 0  - MISCELLANEOUS SENDOUT ID Molecular Testing - [facility]; Future    2. Anxiety  EZEQUIEL-7 indicates moderate anxiety   Patient declines medication/counseling at this time but states she will consider it and re-address at f/u appt    3. Pap smear for cervical cancer screening  Patient due for cervical cancer screening  No previous abnormal paps  Management pending results  - PAP SMEAR    4. Encounter for smoking cessation counseling  Patient agreeable that smoking should be

## 2024-03-25 ENCOUNTER — TELEPHONE (OUTPATIENT)
Dept: FAMILY MEDICINE CLINIC | Age: 24
End: 2024-03-25

## 2024-03-25 DIAGNOSIS — R30.0 DYSURIA: ICD-10-CM

## 2024-03-25 NOTE — TELEPHONE ENCOUNTER
----- Message from Sarahi Wang sent at 3/25/2024  4:33 PM EDT -----  Subject: Results Request    QUESTIONS  Results: pap smear; Ordered by:   Date Performed: 2024-03-21  ---------------------------------------------------------------------------  --------------  CALL BACK INFO    [phone]; OK to leave message on voicemail  ---------------------------------------------------------------------------  --------------

## 2024-03-26 LAB — GYNECOLOGY CYTOLOGY REPORT: NORMAL

## 2024-07-11 ENCOUNTER — E-VISIT (OUTPATIENT)
Dept: PRIMARY CARE CLINIC | Age: 24
End: 2024-07-11
Payer: COMMERCIAL

## 2024-07-11 DIAGNOSIS — R30.0 DYSURIA: Primary | ICD-10-CM

## 2024-07-11 PROCEDURE — 99423 OL DIG E/M SVC 21+ MIN: CPT | Performed by: NURSE PRACTITIONER

## 2024-07-11 NOTE — PROGRESS NOTES
Renee Ugarte (2000) initiated an asynchronous digital communication through [employer].    HPI: per patient questionnaire     Exam: not applicable    Diagnoses and all orders for this visit:  Diagnoses and all orders for this visit:    Dysuria  -     Culture, Urine; Future      Limited sx on questionnaire. She also listed she was treated 4 months ago. Urine culture ordered. Will f/u with results     Time: EV3 - 21 or more minutes were spent on the digital evaluation and management of this patient.24 min     BETHANY Olvera - CNP

## 2024-09-20 ENCOUNTER — HOSPITAL ENCOUNTER (EMERGENCY)
Age: 24
Discharge: HOME | End: 2024-09-21
Payer: COMMERCIAL

## 2024-09-20 DIAGNOSIS — K59.00 CONSTIPATION, UNSPECIFIED CONSTIPATION TYPE: ICD-10-CM

## 2024-09-20 DIAGNOSIS — N36.9 URETHRAL LESION: ICD-10-CM

## 2024-09-20 DIAGNOSIS — R31.29 OTHER MICROSCOPIC HEMATURIA: ICD-10-CM

## 2024-09-20 DIAGNOSIS — R10.32 LEFT LOWER QUADRANT ABDOMINAL PAIN: Primary | ICD-10-CM

## 2024-09-20 DIAGNOSIS — M54.50 ACUTE RIGHT-SIDED LOW BACK PAIN WITHOUT SCIATICA: ICD-10-CM

## 2024-09-20 LAB
ALBUMIN SERPL-MCNC: 4.1 G/DL (ref 3.5–5.2)
ALP SERPL-CCNC: 97 U/L (ref 35–104)
ALT SERPL-CCNC: 20 U/L (ref 0–32)
ANION GAP SERPL CALCULATED.3IONS-SCNC: 10 MMOL/L (ref 7–16)
AST SERPL-CCNC: 20 U/L (ref 0–31)
BACTERIA URNS QL MICRO: ABNORMAL
BASOPHILS # BLD: 0.04 K/UL (ref 0–0.2)
BASOPHILS NFR BLD: 0 % (ref 0–2)
BILIRUB SERPL-MCNC: 0.2 MG/DL (ref 0–1.2)
BILIRUB UR QL STRIP: NEGATIVE
BUN SERPL-MCNC: 7 MG/DL (ref 6–20)
CALCIUM SERPL-MCNC: 9.3 MG/DL (ref 8.6–10.2)
CHLORIDE SERPL-SCNC: 105 MMOL/L (ref 98–107)
CLARITY UR: CLEAR
CO2 SERPL-SCNC: 24 MMOL/L (ref 22–29)
COLOR UR: YELLOW
CREAT SERPL-MCNC: 0.6 MG/DL (ref 0.5–1)
EOSINOPHIL # BLD: 0.17 K/UL (ref 0.05–0.5)
EOSINOPHILS RELATIVE PERCENT: 2 % (ref 0–6)
EPI CELLS #/AREA URNS HPF: ABNORMAL /HPF
ERYTHROCYTE [DISTWIDTH] IN BLOOD BY AUTOMATED COUNT: 13.3 % (ref 11.5–15)
GFR, ESTIMATED: >90 ML/MIN/1.73M2
GLUCOSE SERPL-MCNC: 93 MG/DL (ref 74–99)
GLUCOSE UR STRIP-MCNC: NEGATIVE MG/DL
HCG, URINE, POC: NEGATIVE
HCT VFR BLD AUTO: 43.7 % (ref 34–48)
HGB BLD-MCNC: 14.7 G/DL (ref 11.5–15.5)
HGB UR QL STRIP.AUTO: ABNORMAL
IMM GRANULOCYTES # BLD AUTO: <0.03 K/UL (ref 0–0.58)
IMM GRANULOCYTES NFR BLD: 0 % (ref 0–5)
KETONES UR STRIP-MCNC: ABNORMAL MG/DL
LACTATE BLDV-SCNC: 1.2 MMOL/L (ref 0.5–2.2)
LEUKOCYTE ESTERASE UR QL STRIP: NEGATIVE
LIPASE SERPL-CCNC: 31 U/L (ref 13–60)
LYMPHOCYTES NFR BLD: 3.45 K/UL (ref 1.5–4)
LYMPHOCYTES RELATIVE PERCENT: 30 % (ref 20–42)
Lab: NORMAL
MCH RBC QN AUTO: 30.9 PG (ref 26–35)
MCHC RBC AUTO-ENTMCNC: 33.6 G/DL (ref 32–34.5)
MCV RBC AUTO: 91.8 FL (ref 80–99.9)
MONOCYTES NFR BLD: 0.66 K/UL (ref 0.1–0.95)
MONOCYTES NFR BLD: 6 % (ref 2–12)
MUCOUS THREADS URNS QL MICRO: PRESENT
NEGATIVE QC PASS/FAIL: NORMAL
NEUTROPHILS NFR BLD: 62 % (ref 43–80)
NEUTS SEG NFR BLD: 7.15 K/UL (ref 1.8–7.3)
NITRITE UR QL STRIP: NEGATIVE
PH UR STRIP: 6 [PH] (ref 5–9)
PLATELET # BLD AUTO: 306 K/UL (ref 130–450)
PMV BLD AUTO: 10.6 FL (ref 7–12)
POSITIVE QC PASS/FAIL: NORMAL
POTASSIUM SERPL-SCNC: 4.1 MMOL/L (ref 3.5–5)
PROT SERPL-MCNC: 7.2 G/DL (ref 6.4–8.3)
PROT UR STRIP-MCNC: NEGATIVE MG/DL
RBC # BLD AUTO: 4.76 M/UL (ref 3.5–5.5)
RBC #/AREA URNS HPF: ABNORMAL /HPF
SODIUM SERPL-SCNC: 139 MMOL/L (ref 132–146)
SP GR UR STRIP: >1.03 (ref 1–1.03)
UROBILINOGEN UR STRIP-ACNC: 0.2 EU/DL (ref 0–1)
WBC #/AREA URNS HPF: ABNORMAL /HPF
WBC OTHER # BLD: 11.5 K/UL (ref 4.5–11.5)

## 2024-09-20 PROCEDURE — 80053 COMPREHEN METABOLIC PANEL: CPT

## 2024-09-20 PROCEDURE — 6360000002 HC RX W HCPCS

## 2024-09-20 PROCEDURE — 99285 EMERGENCY DEPT VISIT HI MDM: CPT

## 2024-09-20 PROCEDURE — 96375 TX/PRO/DX INJ NEW DRUG ADDON: CPT

## 2024-09-20 PROCEDURE — 83690 ASSAY OF LIPASE: CPT

## 2024-09-20 PROCEDURE — 83605 ASSAY OF LACTIC ACID: CPT

## 2024-09-20 PROCEDURE — 96374 THER/PROPH/DIAG INJ IV PUSH: CPT

## 2024-09-20 PROCEDURE — 81001 URINALYSIS AUTO W/SCOPE: CPT

## 2024-09-20 PROCEDURE — 85025 COMPLETE CBC W/AUTO DIFF WBC: CPT

## 2024-09-20 RX ORDER — KETOROLAC TROMETHAMINE 30 MG/ML
30 INJECTION, SOLUTION INTRAMUSCULAR; INTRAVENOUS ONCE
Status: COMPLETED | OUTPATIENT
Start: 2024-09-20 | End: 2024-09-20

## 2024-09-20 RX ORDER — ONDANSETRON 2 MG/ML
4 INJECTION INTRAMUSCULAR; INTRAVENOUS ONCE
Status: COMPLETED | OUTPATIENT
Start: 2024-09-20 | End: 2024-09-20

## 2024-09-20 RX ADMIN — KETOROLAC TROMETHAMINE 30 MG: 30 INJECTION, SOLUTION INTRAMUSCULAR at 22:10

## 2024-09-20 RX ADMIN — ONDANSETRON 4 MG: 2 INJECTION INTRAMUSCULAR; INTRAVENOUS at 22:10

## 2024-09-20 ASSESSMENT — PAIN DESCRIPTION - LOCATION: LOCATION: ABDOMEN

## 2024-09-20 ASSESSMENT — PAIN - FUNCTIONAL ASSESSMENT: PAIN_FUNCTIONAL_ASSESSMENT: 0-10

## 2024-09-20 ASSESSMENT — PAIN SCALES - GENERAL: PAINLEVEL_OUTOF10: 7

## 2024-09-21 ENCOUNTER — APPOINTMENT (OUTPATIENT)
Dept: CT IMAGING | Age: 24
End: 2024-09-21
Payer: COMMERCIAL

## 2024-09-21 VITALS
HEIGHT: 59 IN | HEART RATE: 61 BPM | OXYGEN SATURATION: 99 % | SYSTOLIC BLOOD PRESSURE: 103 MMHG | DIASTOLIC BLOOD PRESSURE: 51 MMHG | WEIGHT: 135 LBS | BODY MASS INDEX: 27.21 KG/M2 | TEMPERATURE: 98.8 F | RESPIRATION RATE: 15 BRPM

## 2024-09-21 PROCEDURE — 6370000000 HC RX 637 (ALT 250 FOR IP)

## 2024-09-21 PROCEDURE — 74177 CT ABD & PELVIS W/CONTRAST: CPT

## 2024-09-21 PROCEDURE — 6360000004 HC RX CONTRAST MEDICATION: Performed by: RADIOLOGY

## 2024-09-21 RX ORDER — DOCUSATE SODIUM 100 MG/1
100 CAPSULE, LIQUID FILLED ORAL 2 TIMES DAILY
Qty: 60 CAPSULE | Refills: 0 | Status: SHIPPED | OUTPATIENT
Start: 2024-09-21

## 2024-09-21 RX ORDER — IOPAMIDOL 755 MG/ML
75 INJECTION, SOLUTION INTRAVASCULAR
Status: COMPLETED | OUTPATIENT
Start: 2024-09-21 | End: 2024-09-21

## 2024-09-21 RX ORDER — BISACODYL 5 MG/1
10 TABLET, DELAYED RELEASE ORAL ONCE
Status: COMPLETED | OUTPATIENT
Start: 2024-09-21 | End: 2024-09-21

## 2024-09-21 RX ORDER — POLYETHYLENE GLYCOL 3350 17 G/17G
17 POWDER, FOR SOLUTION ORAL DAILY
Qty: 238 G | Refills: 0 | Status: SHIPPED | OUTPATIENT
Start: 2024-09-21

## 2024-09-21 RX ADMIN — BISACODYL 10 MG: 5 TABLET, COATED ORAL at 01:58

## 2024-09-21 RX ADMIN — IOPAMIDOL 75 ML: 755 INJECTION, SOLUTION INTRAVENOUS at 00:46

## 2024-11-21 ENCOUNTER — PATIENT MESSAGE (OUTPATIENT)
Dept: FAMILY MEDICINE CLINIC | Age: 24
End: 2024-11-21

## 2024-12-02 ENCOUNTER — PATIENT MESSAGE (OUTPATIENT)
Dept: FAMILY MEDICINE CLINIC | Age: 24
End: 2024-12-02

## 2025-02-01 ENCOUNTER — HOSPITAL ENCOUNTER (EMERGENCY)
Age: 25
Discharge: HOME | End: 2025-02-01
Payer: COMMERCIAL

## 2025-02-01 VITALS
SYSTOLIC BLOOD PRESSURE: 117 MMHG | DIASTOLIC BLOOD PRESSURE: 89 MMHG | RESPIRATION RATE: 19 BRPM | HEART RATE: 78 BPM | TEMPERATURE: 97.9 F | OXYGEN SATURATION: 98 %

## 2025-02-01 DIAGNOSIS — B02.9 HERPES ZOSTER WITHOUT COMPLICATION: Primary | ICD-10-CM

## 2025-02-01 LAB
HCG, URINE, POC: NEGATIVE
Lab: NORMAL
NEGATIVE QC PASS/FAIL: NORMAL
POSITIVE QC PASS/FAIL: NORMAL

## 2025-02-01 PROCEDURE — 99283 EMERGENCY DEPT VISIT LOW MDM: CPT

## 2025-02-01 PROCEDURE — 6370000000 HC RX 637 (ALT 250 FOR IP)

## 2025-02-01 RX ORDER — VALACYCLOVIR HYDROCHLORIDE 1 G/1
1000 TABLET, FILM COATED ORAL ONCE
Status: COMPLETED | OUTPATIENT
Start: 2025-02-01 | End: 2025-02-01

## 2025-02-01 RX ORDER — VALACYCLOVIR HYDROCHLORIDE 1 G/1
1000 TABLET, FILM COATED ORAL 3 TIMES DAILY
Qty: 21 TABLET | Refills: 0 | Status: SHIPPED | OUTPATIENT
Start: 2025-02-01 | End: 2025-02-08

## 2025-02-01 RX ADMIN — VALACYCLOVIR 1000 MG: 1 TABLET, FILM COATED ORAL at 20:36

## 2025-02-01 ASSESSMENT — LIFESTYLE VARIABLES
HOW MANY STANDARD DRINKS CONTAINING ALCOHOL DO YOU HAVE ON A TYPICAL DAY: PATIENT DOES NOT DRINK
HOW OFTEN DO YOU HAVE A DRINK CONTAINING ALCOHOL: NEVER

## 2025-02-01 ASSESSMENT — VISUAL ACUITY: OU: 1

## 2025-02-01 ASSESSMENT — ENCOUNTER SYMPTOMS
GASTROINTESTINAL NEGATIVE: 1
ALLERGIC/IMMUNOLOGIC NEGATIVE: 1
EYES NEGATIVE: 1
RESPIRATORY NEGATIVE: 1

## 2025-02-01 ASSESSMENT — PAIN - FUNCTIONAL ASSESSMENT: PAIN_FUNCTIONAL_ASSESSMENT: NONE - DENIES PAIN

## 2025-02-02 NOTE — ED PROVIDER NOTES
Chronic Conditions affecting Care: Herpes    EMERGENCY DEPARTMENT COURSE and DIFFERENTIAL DIAGNOSIS/MDM:   Vitals:    Vitals:    02/01/25 1908 02/01/25 1931   BP:  117/89   Pulse: 78    Resp:  19   Temp: 97.9 °F (36.6 °C)    SpO2: 98%        Patient was given the following medications:  Medications   valACYclovir (VALTREX) tablet 1,000 mg (1,000 mg Oral Given 2/1/25 2036)         CONSULTS: (Who and What was discussed)  None  Discussion with Other Profesionals : None    Social Determinants : None    Records Reviewed : None    CC/HPI Summary, DDx, ED Course, and Reassessment: Renee Ugarte is a 24 y.o. female who presents to the ED with complaints of herpetic lesions to her face that began today.  Patient states that she has a history of this occurring to this same area.  Patient denies any visual disturbances, headache, lightheadedness, dizziness, fever, chills, body aches.  Patient states she tried to contact her PCP today but was unable to get a hold of them.  Patient states she just needs to get a prescription for valacyclovir.  Patient denies any injury.  Patient denies any chest pain, shortness of breath, abdominal pain, nausea, vomiting, diarrhea.  Patient denies any other symptoms.  Patient has no other complaints at this time.  Differential diagnose includes but is not limited to atopic dermatitis, herpes zoster, contact dermatitis  POC pregnancy obtained.  Patient received valacyclovir.  POC pregnancy negative.  Discussed diagnosis and plan to follow-up with PCP along with the use of valacyclovir.  Patient advised to return to the ED if any new or worsening symptoms.  Patient verbalized understanding and is in agreement with the plan         Disposition Considerations (include 1 Tests not done, Shared Decision Making, Pt Expectation of Test or Tx.): Based on HPI and PE, no testing is indicated at this time.  Diagnosis of herpes zoster without complication is consistent exam findings  Appropriate for

## 2025-03-19 ENCOUNTER — TELEPHONE (OUTPATIENT)
Dept: FAMILY MEDICINE CLINIC | Age: 25
End: 2025-03-19

## 2025-03-19 ENCOUNTER — E-VISIT (OUTPATIENT)
Dept: PRIMARY CARE CLINIC | Age: 25
End: 2025-03-19
Payer: COMMERCIAL

## 2025-03-19 DIAGNOSIS — B02.9 HERPES ZOSTER WITHOUT COMPLICATION: Primary | ICD-10-CM

## 2025-03-19 PROCEDURE — 99422 OL DIG E/M SVC 11-20 MIN: CPT | Performed by: NURSE PRACTITIONER

## 2025-03-19 RX ORDER — VALACYCLOVIR HYDROCHLORIDE 1 G/1
1000 TABLET, FILM COATED ORAL 3 TIMES DAILY
Qty: 21 TABLET | Refills: 0 | Status: SHIPPED | OUTPATIENT
Start: 2025-03-19 | End: 2025-03-26

## 2025-03-19 NOTE — PROGRESS NOTES
Reviewed questionnaire and photo    Reviewed meds/allergies    Dx Shingles    Plan Patient has concern for shingles, hx of shingles with rash in similar location. Last treated via ER with valtrex 2/1/25. Will repeat valtrex course. Follow up with PCP regarding recurrent shingles outbreak    Time spent on visit 11 min